# Patient Record
Sex: FEMALE | Race: WHITE | NOT HISPANIC OR LATINO | URBAN - METROPOLITAN AREA
[De-identification: names, ages, dates, MRNs, and addresses within clinical notes are randomized per-mention and may not be internally consistent; named-entity substitution may affect disease eponyms.]

---

## 2023-02-19 PROBLEM — J20.9 ACUTE BRONCHITIS DUE TO INFECTION: Status: ACTIVE | Noted: 2023-02-19

## 2023-02-19 PROBLEM — F17.200 TOBACCO DEPENDENCE: Status: ACTIVE | Noted: 2023-02-19

## 2023-02-19 PROBLEM — F32.A DEPRESSION: Status: ACTIVE | Noted: 2023-02-19

## 2023-02-19 PROBLEM — R05.8 POST-VIRAL COUGH SYNDROME: Status: ACTIVE | Noted: 2023-02-19

## 2023-02-19 PROBLEM — H35.62 RETINAL HEMORRHAGE, LEFT EYE: Status: ACTIVE | Noted: 2023-02-19

## 2023-02-19 PROBLEM — H25.13 CATARACT, NUCLEAR SCLEROTIC, BOTH EYES: Status: ACTIVE | Noted: 2023-02-19

## 2023-02-19 PROBLEM — H52.4 BILATERAL PRESBYOPIA: Status: ACTIVE | Noted: 2023-02-19

## 2023-02-19 PROBLEM — G40.109 TEMPORAL LOBE EPILEPSY (MULTI): Status: ACTIVE | Noted: 2023-02-19

## 2023-02-19 PROBLEM — E55.9 VITAMIN D DEFICIENCY: Status: ACTIVE | Noted: 2023-02-19

## 2023-02-19 PROBLEM — H52.03 HYPERMETROPIA OF BOTH EYES: Status: ACTIVE | Noted: 2023-02-19

## 2023-02-19 RX ORDER — CHOLECALCIFEROL (VITAMIN D3) 25 MCG
TABLET ORAL
COMMUNITY

## 2023-02-19 RX ORDER — DIVALPROEX SODIUM 250 MG/1
TABLET, FILM COATED, EXTENDED RELEASE ORAL
COMMUNITY
Start: 2022-06-21 | End: 2024-05-24 | Stop reason: SDUPTHER

## 2023-02-19 RX ORDER — MECLIZINE HYDROCHLORIDE 25 MG/1
25 TABLET ORAL 2 TIMES DAILY
COMMUNITY
End: 2023-04-25 | Stop reason: ALTCHOICE

## 2023-02-19 RX ORDER — ESCITALOPRAM OXALATE 10 MG/1
10 TABLET ORAL DAILY
COMMUNITY
End: 2024-05-24

## 2023-03-10 ENCOUNTER — APPOINTMENT (OUTPATIENT)
Dept: PRIMARY CARE | Facility: CLINIC | Age: 58
End: 2023-03-10
Payer: COMMERCIAL

## 2023-03-22 LAB
ALANINE AMINOTRANSFERASE (SGPT) (U/L) IN SER/PLAS: 18 U/L (ref 7–45)
ALBUMIN (G/DL) IN SER/PLAS: 4.4 G/DL (ref 3.4–5)
ALKALINE PHOSPHATASE (U/L) IN SER/PLAS: 67 U/L (ref 33–110)
ANION GAP IN SER/PLAS: 19 MMOL/L (ref 10–20)
APPEARANCE, URINE: ABNORMAL
ASPARTATE AMINOTRANSFERASE (SGOT) (U/L) IN SER/PLAS: 18 U/L (ref 9–39)
BACTERIA, URINE: ABNORMAL /HPF
BASOPHILS (10*3/UL) IN BLOOD BY AUTOMATED COUNT: 0.05 X10E9/L (ref 0–0.1)
BASOPHILS/100 LEUKOCYTES IN BLOOD BY AUTOMATED COUNT: 0.6 % (ref 0–2)
BILIRUBIN TOTAL (MG/DL) IN SER/PLAS: 0.3 MG/DL (ref 0–1.2)
BILIRUBIN, URINE: NEGATIVE
BLOOD, URINE: NEGATIVE
C REACTIVE PROTEIN (MG/L) IN SER/PLAS BY HIGH SENSIT: 1.9 MG/L
CALCIDIOL (25 OH VITAMIN D3) (NG/ML) IN SER/PLAS: 29 NG/ML
CALCIUM (MG/DL) IN SER/PLAS: 9.3 MG/DL (ref 8.6–10.6)
CARBON DIOXIDE, TOTAL (MMOL/L) IN SER/PLAS: 23 MMOL/L (ref 21–32)
CHLORIDE (MMOL/L) IN SER/PLAS: 104 MMOL/L (ref 98–107)
CHOLESTEROL (MG/DL) IN SER/PLAS: 215 MG/DL (ref 0–199)
CHOLESTEROL IN HDL (MG/DL) IN SER/PLAS: 63.5 MG/DL
CHOLESTEROL/HDL RATIO: 3.4
COLOR, URINE: YELLOW
CREATININE (MG/DL) IN SER/PLAS: 0.75 MG/DL (ref 0.5–1.05)
EOSINOPHILS (10*3/UL) IN BLOOD BY AUTOMATED COUNT: 0.12 X10E9/L (ref 0–0.7)
EOSINOPHILS/100 LEUKOCYTES IN BLOOD BY AUTOMATED COUNT: 1.5 % (ref 0–6)
ERYTHROCYTE DISTRIBUTION WIDTH (RATIO) BY AUTOMATED COUNT: 13 % (ref 11.5–14.5)
ERYTHROCYTE MEAN CORPUSCULAR HEMOGLOBIN CONCENTRATION (G/DL) BY AUTOMATED: 33.5 G/DL (ref 32–36)
ERYTHROCYTE MEAN CORPUSCULAR VOLUME (FL) BY AUTOMATED COUNT: 101 FL (ref 80–100)
ERYTHROCYTES (10*6/UL) IN BLOOD BY AUTOMATED COUNT: 4.1 X10E12/L (ref 4–5.2)
GFR FEMALE: >90 ML/MIN/1.73M2
GLUCOSE (MG/DL) IN SER/PLAS: 87 MG/DL (ref 74–99)
GLUCOSE, URINE: NEGATIVE MG/DL
HEMATOCRIT (%) IN BLOOD BY AUTOMATED COUNT: 41.5 % (ref 36–46)
HEMOGLOBIN (G/DL) IN BLOOD: 13.9 G/DL (ref 12–16)
IMMATURE GRANULOCYTES/100 LEUKOCYTES IN BLOOD BY AUTOMATED COUNT: 0.5 % (ref 0–0.9)
KETONES, URINE: ABNORMAL MG/DL
LDL: 122 MG/DL (ref 0–99)
LEUKOCYTE ESTERASE, URINE: NEGATIVE
LEUKOCYTES (10*3/UL) IN BLOOD BY AUTOMATED COUNT: 7.9 X10E9/L (ref 4.4–11.3)
LYMPHOCYTES (10*3/UL) IN BLOOD BY AUTOMATED COUNT: 2.79 X10E9/L (ref 1.2–4.8)
LYMPHOCYTES/100 LEUKOCYTES IN BLOOD BY AUTOMATED COUNT: 35.5 % (ref 13–44)
MONOCYTES (10*3/UL) IN BLOOD BY AUTOMATED COUNT: 0.6 X10E9/L (ref 0.1–1)
MONOCYTES/100 LEUKOCYTES IN BLOOD BY AUTOMATED COUNT: 7.6 % (ref 2–10)
MUCUS, URINE: ABNORMAL /LPF
NEUTROPHILS (10*3/UL) IN BLOOD BY AUTOMATED COUNT: 4.27 X10E9/L (ref 1.2–7.7)
NEUTROPHILS/100 LEUKOCYTES IN BLOOD BY AUTOMATED COUNT: 54.3 % (ref 40–80)
NITRITE, URINE: NEGATIVE
NRBC (PER 100 WBCS) BY AUTOMATED COUNT: 0 /100 WBC (ref 0–0)
PH, URINE: 5 (ref 5–8)
PLATELETS (10*3/UL) IN BLOOD AUTOMATED COUNT: 237 X10E9/L (ref 150–450)
POTASSIUM (MMOL/L) IN SER/PLAS: 4.5 MMOL/L (ref 3.5–5.3)
PROTEIN TOTAL: 6.6 G/DL (ref 6.4–8.2)
PROTEIN, URINE: ABNORMAL MG/DL
RBC, URINE: 1 /HPF (ref 0–5)
SODIUM (MMOL/L) IN SER/PLAS: 141 MMOL/L (ref 136–145)
SPECIFIC GRAVITY, URINE: 1.03 (ref 1–1.03)
SQUAMOUS EPITHELIAL CELLS, URINE: 2 /HPF
THYROTROPIN (MIU/L) IN SER/PLAS BY DETECTION LIMIT <= 0.05 MIU/L: 1.61 MIU/L (ref 0.44–3.98)
TRIGLYCERIDE (MG/DL) IN SER/PLAS: 146 MG/DL (ref 0–149)
UREA NITROGEN (MG/DL) IN SER/PLAS: 16 MG/DL (ref 6–23)
UROBILINOGEN, URINE: <2 MG/DL (ref 0–1.9)
VLDL: 29 MG/DL (ref 0–40)
WBC, URINE: 1 /HPF (ref 0–5)

## 2023-03-23 ENCOUNTER — APPOINTMENT (OUTPATIENT)
Dept: PRIMARY CARE | Facility: CLINIC | Age: 58
End: 2023-03-23
Payer: COMMERCIAL

## 2023-04-24 PROBLEM — J20.9 ACUTE BRONCHITIS DUE TO INFECTION: Status: RESOLVED | Noted: 2023-02-19 | Resolved: 2023-04-24

## 2023-04-24 PROBLEM — Z00.00 ANNUAL PHYSICAL EXAM: Status: ACTIVE | Noted: 2023-04-24

## 2023-04-24 PROBLEM — F32.A DEPRESSION: Status: RESOLVED | Noted: 2023-02-19 | Resolved: 2023-04-24

## 2023-04-24 PROBLEM — H52.4 BILATERAL PRESBYOPIA: Status: RESOLVED | Noted: 2023-02-19 | Resolved: 2023-04-24

## 2023-04-24 PROBLEM — H52.03 HYPERMETROPIA OF BOTH EYES: Status: RESOLVED | Noted: 2023-02-19 | Resolved: 2023-04-24

## 2023-04-24 PROBLEM — H35.62 RETINAL HEMORRHAGE, LEFT EYE: Status: RESOLVED | Noted: 2023-02-19 | Resolved: 2023-04-24

## 2023-04-24 PROBLEM — R05.8 POST-VIRAL COUGH SYNDROME: Status: RESOLVED | Noted: 2023-02-19 | Resolved: 2023-04-24

## 2023-04-24 PROBLEM — H25.13 CATARACT, NUCLEAR SCLEROTIC, BOTH EYES: Status: RESOLVED | Noted: 2023-02-19 | Resolved: 2023-04-24

## 2023-04-24 PROBLEM — E78.00 HIGH CHOLESTEROL: Status: ACTIVE | Noted: 2023-04-24

## 2023-04-24 RX ORDER — LAMOTRIGINE 150 MG/1
1 TABLET ORAL 2 TIMES DAILY
COMMUNITY
Start: 2018-04-23 | End: 2024-02-02 | Stop reason: ALTCHOICE

## 2023-04-24 NOTE — ASSESSMENT & PLAN NOTE
Check cardiac calcium score   Discuss starting a statin   Encouraged stopping smoking, start exercise and follow the dash diet

## 2023-04-24 NOTE — ASSESSMENT & PLAN NOTE
Increased risk for breast cancer and pancreatic cancer  Due for repeat mammogram and pap test  - recommend exam twice a year with MRI q 6 months alternating with mammogram.  Last mammogram 10/21/21   - self pay Pancreas MRI normal 3/2/22  Previous Pap test 1/22/2015 .  Pap done today     DEXA Scan ordered     Second shingles vaccine due  New Covid booster due

## 2023-04-24 NOTE — PROGRESS NOTES
"Physical Exam    Name Doretha Rosales    Date of Service :4/25/2023    Doretha Rosales is a 57 y.o. year old female who is being seen for a comprehensive exam  Has a  history of generalized epilepsy since age 16   She underwent genetic testing in May of 2019 for her family history of multiple cancers and was found not to be a carrier of any concerning genes however based on family history, she does have a higher than average chance developing pancreatic cancer and breast cancer. About twice the average person's risk to develop pancreatic cancer (around a 3.2% chance) and her lifetime risk of breast cancer exceeds 20% based on the Tyrer-Cuzick model. She had a self pay MRI of pancreas last year. She has a mammogram scheduled for later today.  She feels like she \"is falling apart\".  Falling more. Was in [place] and tripped and fell down four times.  Also very tired every morning.  It is a struggle to get up. Takes a nap every day.  Always had a poor memory.  Her mouth is watering a ton this year.  Also about 7 weeks ago after biting down on a [employer] bar had significant pain in her right mandible and was unable to open it fully. Went to her dentist.  No xrays.  Medrol dose pack given. It is only about 60 % better.   Has a new neurologist she has an upcoming appointment with.  Dr. Marcano.       ROS:  No foot drop.  No back pain.  poor memory for 20 years.  She is not getting lost.  She is not getting confused.  She did miss the exit on her way over her.  She is distracted and busy.  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  She is working a lot and is very busy.  She does snore.  Still smoking about 2 packs per week .  No chest pain or respiratory complaints.  No postmenopausal bleeding, no urinary symptoms .      Patient Active Problem List   Diagnosis    Temporal lobe epilepsy (CMS/HCC)    Tobacco dependence    Vitamin D deficiency    Annual physical exam    High cholesterol    Encounter for Papanicolaou " smear for cervical cancer screening    Excessive daytime sleepiness    Asymptomatic menopausal state        Past Medical History:   Diagnosis Date    Bilateral presbyopia 2023    Cataract, nuclear sclerotic, both eyes 2023    Colitis presumed to be due to infection 2020    Depression 2023    Lateral epicondylitis     Retinal hemorrhage, left eye 2023    Vertigo         Past Surgical History:   Procedure Laterality Date     SECTION, CLASSIC      CHOLECYSTECTOMY      ENDOMETRIAL BIOPSY  10/19/2016    MOUTH SURGERY  2015    Oral Surgery Tooth Extraction    TUBAL LIGATION          Social History     Tobacco Use    Smoking status: Every Day     Years: 20.00     Types: Cigarettes    Smokeless tobacco: Never      Social History     Social History Narrative    . to Jonathan. Two children. Jonathan is head of [employer]. She is a consultant for non profits. Has two children. Her Son is a student at [employer]. Daughter graduated from [employer]. 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 Doretha is a current smoker.  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 now independent consulting. walking daily. lifting weights          Family History   Problem Relation Name Age of Onset    Pancreatic cancer Mother          d. age 49    Cerebral aneurysm Father      Stroke Father          d. age 84    Other (calcium oxalate renal stones) Sister      Hemochromatosis Sister      Other (calcium oxalate renal stones [Other]) Brother      Breast cancer Mother's Sister  64        d. age 65    Prostate cancer Mother's Brother      Prostate cancer Father's Brother  75        d.age 89    Breast cancer Maternal Grandmother          d. age 80    Cancer Paternal Grandfather  60        d. 65    Hemochromatosis Sibling      Breast cancer Other grandmother         Current Outpatient Medications:     cholecalciferol (Vitamin D-3)  "25 MCG (1000 UT) tablet, Take by mouth., Disp: , Rfl:     divalproex (Depakote ER) 250 mg 24 hr tablet, Take by mouth. 1 pill in the morning and 1 pill at night, Disp: , Rfl:     escitalopram (Lexapro) 10 mg tablet, Take 1 tablet (10 mg) by mouth once daily., Disp: , Rfl:     lamoTRIgine (LaMICtal) 150 mg tablet, Take 1 tablet (150 mg) by mouth in the morning and 1 tablet (150 mg) before bedtime., Disp: , Rfl:     Allergies   Allergen Reactions    Meperidine Other and Unknown     Vomiting     /88 (BP Location: Left arm, Patient Position: Sitting)   Pulse 75   Temp 36.9 °C (98.5 °F)   Ht 1.549 m (5' 1\")   Wt 74.4 kg (164 lb)   SpO2 98%   BMI 30.99 kg/m²  Body mass index is 30.99 kg/m².    Physical Exam  Constitutional:       General: She is not in acute distress.     Appearance: Normal appearance. She is not ill-appearing.   HENT:      Right Ear: Tympanic membrane and ear canal normal.      Left Ear: Tympanic membrane and ear canal normal.      Mouth/Throat:      Mouth: Mucous membranes are moist.      Pharynx: No oropharyngeal exudate or posterior oropharyngeal erythema.   Eyes:      Extraocular Movements: Extraocular movements intact.      Conjunctiva/sclera: Conjunctivae normal.      Pupils: Pupils are equal, round, and reactive to light.   Cardiovascular:      Rate and Rhythm: Normal rate.      Pulses: Normal pulses.      Heart sounds: Normal heart sounds. No murmur heard.  Pulmonary:      Effort: Pulmonary effort is normal. No respiratory distress.      Breath sounds: Normal breath sounds. No wheezing, rhonchi or rales.   Chest:   Breasts:     Right: Normal. No mass, nipple discharge or skin change.      Left: No mass, nipple discharge or skin change.   Abdominal:      General: Bowel sounds are normal. There is no distension.      Palpations: There is no mass.      Tenderness: There is no abdominal tenderness. There is no guarding.   Genitourinary:     Vagina: Normal.      Cervix: Normal.      " Uterus: Normal.       Adnexa: Right adnexa normal and left adnexa normal.        Right: No mass.          Left: No mass.     Musculoskeletal:         General: Normal range of motion.   Lymphadenopathy:      Cervical: No cervical adenopathy.      Upper Body:      Right upper body: No supraclavicular or axillary adenopathy.      Left upper body: No supraclavicular or axillary adenopathy.      Lower Body: No right inguinal adenopathy. No left inguinal adenopathy.   Skin:     General: Skin is warm and dry.      Findings: No rash.      Comments: No suspicious rashes or lesions   Neurological:      General: No focal deficit present.      Mental Status: She is alert.   Psychiatric:         Mood and Affect: Mood normal.         RESULTS/DATA:  Reviewed Standard Labs for this physical with patient ( any significant issues addressed in A/P )     ECG:  Sinus rhythm. Horizontal axis. Normal variant of EKG  .       Lifetime ASCVD Risk: 39% Current 10-Year ASCVD Risk: 5.88% - Borderline Risk Composite Updated 10-Year ASCVD Risk: N/A Current 10-Year ASCVD Risk Had No Treatment Been Initiated: N/A Previous 10-Year ASCVD Risk: N/A Optimal 10-Year ASCVD Risk: 1.51%   Statin + Aspirin N/A BP drug(s) + Aspirin N/A Statin + BP drug(s) 3.23% Statin + Stop smoking 3.22% Stop smoking + Aspirin N/A BP drug(s) + Stop smoking 3.14% Statin + BP drug(s) + Aspirin N/A BP drug(s) + Stop smoking + Aspirin N/A Statin + BP drug(s) + Stop smoking 2.36% Statin + Stop smoking + Aspirin N/A Statin + BP drug(s) + Stop smoking + Aspirin N/A     Assessment/Plan   See patient information/visit summary   Follow up in 3 months    Problem List Items Addressed This Visit       Temporal lobe epilepsy (CMS/HCC)    Tobacco dependence     CT Low dose lung cancer screening          Relevant Orders    CT lung screening low dose    CT cardiac scoring wo IV contrast    Vitamin D deficiency     Start vitamin D 2000 units a day          Annual physical exam - Primary      Increased risk for breast cancer and pancreatic cancer  Due for repeat mammogram and pap test  - recommend exam twice a year with MRI q 6 months alternating with mammogram.  Last mammogram 10/21/21   - self pay Pancreas MRI normal 3/2/22  Previous Pap test 1/22/2015 .  Pap done today     DEXA Scan ordered     Second shingles vaccine due  New Covid booster due          High cholesterol     Check cardiac calcium score   Discuss starting a statin   Encouraged stopping smoking, start exercise and follow the dash diet         Relevant Orders    CT cardiac scoring wo IV contrast    Encounter for Papanicolaou smear for cervical cancer screening    Relevant Orders    THINPREP PAP TEST    Excessive daytime sleepiness    Relevant Orders    Home sleep apnea test (HSAT)    Asymptomatic menopausal state    Relevant Orders    XR DEXA bone density     Other Visit Diagnoses       Mandibular pain        Relevant Orders    XR temporomandibular joint right open and closed            Bryanna uDrham MD

## 2023-04-25 ENCOUNTER — OFFICE VISIT (OUTPATIENT)
Dept: PRIMARY CARE | Facility: CLINIC | Age: 58
End: 2023-04-25
Payer: COMMERCIAL

## 2023-04-25 VITALS
OXYGEN SATURATION: 98 % | DIASTOLIC BLOOD PRESSURE: 88 MMHG | SYSTOLIC BLOOD PRESSURE: 148 MMHG | HEIGHT: 61 IN | BODY MASS INDEX: 30.96 KG/M2 | TEMPERATURE: 98.5 F | WEIGHT: 164 LBS | HEART RATE: 75 BPM

## 2023-04-25 DIAGNOSIS — Z00.00 ANNUAL PHYSICAL EXAM: Primary | ICD-10-CM

## 2023-04-25 DIAGNOSIS — E78.00 HIGH CHOLESTEROL: ICD-10-CM

## 2023-04-25 DIAGNOSIS — F17.200 TOBACCO DEPENDENCE: ICD-10-CM

## 2023-04-25 DIAGNOSIS — G40.109 TEMPORAL LOBE EPILEPSY (MULTI): ICD-10-CM

## 2023-04-25 DIAGNOSIS — Z78.0 ASYMPTOMATIC MENOPAUSAL STATE: ICD-10-CM

## 2023-04-25 DIAGNOSIS — E55.9 VITAMIN D DEFICIENCY: ICD-10-CM

## 2023-04-25 DIAGNOSIS — Z12.4 ENCOUNTER FOR PAPANICOLAOU SMEAR FOR CERVICAL CANCER SCREENING: ICD-10-CM

## 2023-04-25 DIAGNOSIS — G47.19 EXCESSIVE DAYTIME SLEEPINESS: ICD-10-CM

## 2023-04-25 DIAGNOSIS — R68.84 MANDIBULAR PAIN: ICD-10-CM

## 2023-04-25 PROCEDURE — UHSPHYS PR UH SELECT PHYSICAL: Performed by: INTERNAL MEDICINE

## 2023-04-25 PROCEDURE — 88175 CYTOPATH C/V AUTO FLUID REDO: CPT

## 2023-04-25 PROCEDURE — 87624 HPV HI-RISK TYP POOLED RSLT: CPT

## 2023-04-25 NOTE — PATIENT INSTRUCTIONS
Your gyn exam and  pap test was done today  mammogram will be done today   Bone density ordered   Start vitamin D 2000 units    Would recommend you get CT lung cancer screening  Please consider stopping smoking    Would recommend you get CT cardiac score to evaluate for early coronary artery disease       Your cholesterol is mildly elevated as is your blood pressure.  We may consider starting you on a cholesterol lowering medication in the future.  Stopping smoking is the best thing you can due to lower your cardiovascular risk.  Please also check your blood pressure outside of the office at rest and let me know what your bp is.  Your goal is less than 130/80    Lifetime ASCVD Risk: 39% Current 10-Year ASCVD Risk: 5.88% - Borderline Risk Composite Updated 10-Year ASCVD Risk: N/A Optimal 10-Year ASCVD Risk: 1.51%     And you should aim to follow the DASH diet. The DASH diet is  a combination diet rich in fruits, vegetables, legumes, and low-fat dairy products and low in snacks, sweets, meats, and saturated and total fat. The DASH diet is comprised of four to five servings of fruit, four to five servings of vegetables, two to three servings of low-fat dairy per day, and <25 percent fat.    Will obtain a sleep study for your excessive fatigue and poor memory   Will get an xray of your mandible as well   Obtain the new COVID bivalent booster  Obtain your second shingles vaccine    Follow up in 3 months for a recheck of your health issues

## 2023-04-28 ENCOUNTER — DOCUMENTATION (OUTPATIENT)
Dept: PRIMARY CARE | Facility: CLINIC | Age: 58
End: 2023-04-28
Payer: COMMERCIAL

## 2023-04-28 DIAGNOSIS — R42 VERTIGO: Primary | ICD-10-CM

## 2023-04-28 RX ORDER — ONDANSETRON 4 MG/1
4 TABLET, ORALLY DISINTEGRATING ORAL EVERY 8 HOURS PRN
Qty: 20 TABLET | Refills: 0 | Status: SHIPPED | OUTPATIENT
Start: 2023-04-28 | End: 2023-05-05

## 2023-04-28 RX ORDER — MECLIZINE HYDROCHLORIDE 25 MG/1
25 TABLET ORAL 3 TIMES DAILY PRN
Qty: 30 TABLET | Refills: 11 | Status: SHIPPED | OUTPATIENT
Start: 2023-04-28 | End: 2023-08-30 | Stop reason: SDUPTHER

## 2023-04-28 NOTE — PROGRESS NOTES
4 or 5 yr ago I got vertigo bad as had to go to hospital.  It came quxk as it did. Just now. I need whatever medicine I need. Cleaning.lady can pick it up. Your oohine number is down stairs and I can't get there. Can u call me?    Sent via the "[employer] S21 5G, an [employer] 5G smartphone  Get [place] for Android      Spoke with Doretha, she had acute onset of vertigo. Speaking clearly.  Was in bed.    No focal neurologic concerns. No headache.  Will call in zofran and meclazine.    Advised ER for any dehydration, not able to keep fluids down or if she needs relief from her symptoms

## 2023-05-03 DIAGNOSIS — R42 VERTIGO: Primary | ICD-10-CM

## 2023-05-03 LAB
COMPLETE PATHOLOGY REPORT: NORMAL
CONVERTED CLINICAL DIAGNOSIS-HISTORY: NORMAL
CONVERTED DIAGNOSIS COMMENT: NORMAL
CONVERTED FINAL DIAGNOSIS: NORMAL
CONVERTED FINAL REPORT PDF LINK TO COPY AND PASTE: NORMAL

## 2023-05-07 ENCOUNTER — TELEPHONE (OUTPATIENT)
Dept: PRIMARY CARE | Facility: CLINIC | Age: 58
End: 2023-05-07
Payer: COMMERCIAL

## 2023-05-07 DIAGNOSIS — J01.90 ACUTE NON-RECURRENT SINUSITIS, UNSPECIFIED LOCATION: Primary | ICD-10-CM

## 2023-05-07 PROBLEM — R42 VERTIGO: Status: ACTIVE | Noted: 2023-05-07

## 2023-05-07 PROBLEM — G40.909 EPILEPSY (MULTI): Status: ACTIVE | Noted: 2023-05-07

## 2023-05-07 RX ORDER — AMOXICILLIN AND CLAVULANATE POTASSIUM 875; 125 MG/1; MG/1
875 TABLET, FILM COATED ORAL 2 TIMES DAILY
Qty: 20 TABLET | Refills: 0 | Status: SHIPPED | OUTPATIENT
Start: 2023-05-07 | End: 2023-05-17

## 2023-05-07 NOTE — TELEPHONE ENCOUNTER
Me again.    When I went to see you last Monday I had had a cold for about 5 days. Mostly just a bad sore throat. Of course, I tested for covid 19 before coming and it was negative.  Things got pretty interesting subsequent to my visit with the severe vertigo and it seemed to trump the cold. But, the cold remained and I was dinking tea with honey like it was going out of style. Friday, it seemed to move to my head, though my sore throat remained. I got a low fever somewhere around Friday. That night I noticed that the mucus was dark green. Yesterday, I remembered that dark green could signal an infection. It was not gone this morning. I have had a couple of sinus infections in my life and I am thinking this could be one of them. What do you think?    My ears hurt a little bit too, don't know if that is from infection or part of this vertigo thing (the intensity of which comes and goes).    I think my question to you is about a sinus infection and what I would do about it.    As always, thanks.      [address]  [place], [place] [address] [phone]  [email or web address]    From: Bryanna Durham <[email or web address]   Sent: Surendra, May 7, 2023 12:17 PM  To: Doretha Rosales <[email or web address]>  Subject: RE: From your problem patient    Cipriano Coyne,  It is definitely a possibility. Are you still having head congestion and green mucous?     Yep, that's why I decided to write this morning. It hasn't really changed for days.      [address]  [place], [place] [address] [phone]  [email or web address]

## 2023-06-06 LAB — VALPROIC ACID (UG/ML) IN SER/PLAS: 41 UG/ML (ref 50–100)

## 2023-06-07 LAB — LAMOTRIGINE LEVEL: 15.6 UG/ML (ref 2.5–15)

## 2023-08-30 DIAGNOSIS — R42 VERTIGO: ICD-10-CM

## 2023-08-30 RX ORDER — MECLIZINE HYDROCHLORIDE 25 MG/1
25 TABLET ORAL 3 TIMES DAILY PRN
Qty: 30 TABLET | Refills: 0 | Status: SHIPPED | OUTPATIENT
Start: 2023-08-30 | End: 2024-08-29

## 2023-08-30 NOTE — PROGRESS NOTES
Thank you!! Any verdict on the prescription request? It did happen slightly again this morning, but I just ate a lot when it did and took only one pill so I am getting there on how to handle it.      [address]  [place], [place] [address]  [phone]  [email or web address]       From: Pili Mcleod <[email or web address]   Sent: Wednesday, August 30, 2023 1:40 PM  To: Doretha Rosales <[email or web address]>; Bryanna Durham <[email or web address]>  Subject: RE: vertigo    Hi Doretha,   I spoke with Dr. Ortiz assistant, she only has vertigo clinic on Tuesday at [facility] first available is October.  Dr. Jimenes has vertigo clinic on Mondays in [place] and Tuesday at [facility] first available is October as well.    Appointment- (This is a 2 part appointment)  Jennifer Ortiz [facility]  [address]  10/3/2023   830am-Audiology #[id number]  9am- Jennifer Ortiz #146    Please confirm    Pili Mcleod MA  Patient   Bryanna Durham MD  [address] [place] [address]  Phone [phone]  Fax [phone]  Email- [email or web address]                From: Doretha Rosales <[email or web address]>   Sent: Tuesday, August 29, 2023 1:41 PM  To: Bryanna Durham <[email or web address]>  Cc: Pili Mcleod <[email or web address]>  Subject: RE: vertigo    I know, it is the most ridiculous, yet totally debilitating thing. I had to cancel a lunch meeting yesterday and I felt socially/professionally RIDICULOUS! The last time Pili reached out to those doctors, it was 4 months before I could get     I know, it is the most ridiculous, yet totally debilitating thing. I had to cancel a lunch meeting yesterday and I felt socially/professionally RIDICULOUS!     The last time Pili reached out to those doctors, it was 4 months before I could get an appointment. I guess that will have to be what it is because it is what it is! There really isn't  anything else I can do except the Meclazine is somewhat effective. It does upset my stomach, but that is the lesser of two evils! If I took two per day, I would have ten days' supply. Of course, I could never need them again or I could need them tomorrow. Just to make me feel better would you write me another prescription?    I have a new Pharmacy because we moved to [place] and are now trendy empty nesters ??. The Pharmacy is in [facility]: [phone].    So, Pili, if you could do your magic, I would be most grateful. I will only be in town Mondays and Fridays for the next 2 months.    Thanks!            [address]  [place], [place] [address]  [phone]  [email or web address]       From: Bryanna Durham <[email or web address]>   Sent: Tuesday, August 29, 2023 12:58 PM  To: Doretha Rosales <[email or web address]>  Cc: Pili Mcleod <[email or web address]>  Subject: RE: vertigo    Hi Doretha,  First of all, bennett I am sorry to hear that your vertigo came back.  I would recommend getting you to another ENT who specializes In Vertigo for a second opinion   The names I usually give out are Dr. Amber Jimenes or JOAQUINA Berry MD [facility]  [address] [place] [address]  Phone [phone]  Fax [phone]      From: Doretha Rosales <[email or web address]>   Sent: Tuesday, August 29, 2023 11:16 AM  To: Bryanna Durham <[email or web address]>  Subject: vertigo    Hi there, I am not sure that you remember that I had a terrible experience with vertigo in April. Pili was unable to find an appointment with your recommended vertigo person at the time and It coincided with a neurologist appointment I already     Hi there,   I am not sure that you remember that I had a terrible experience with vertigo in April. Pili was unable to find an appointment with your recommended vertigo person at the time and It coincided  with a neurologist appointment I already had scheduled for months. This was just a regular appointment. Pili found me a physical therapist who did all of the tricks (which didn't work) and said that I should come back regularly, which I didn't. The problem went away after a few days. The neurologist over-reacted in my view and gave me an MRI to see if I had a stroke. I went to an ENT who is a family friend and had the whole 3 hour test. The 's conclusion was that he couldn't figure out and neurology was probable the way to go.    When it first happened, you gave me meclizine as I was pretty debilitated and couldn't get out of bed without crawling. Yesterday, it came on suddenly again. I ate something, took the meclizine and lay down.  It was fine after a few hours. I have a business meeting in [place], so I even flew last night feeling fine. I got up this morning and came to my temporary work and was just fine. I brought the meclizine. It just came on suddenly at about the same time as yesterday - all of the sudden.  So, I took two pills about a half an hour ago and haven't gotten up so that I don't freak anyone out.    So, what on earth do I do now? It has only been 4 months since it happened, but now it has happened twice in two days. I am going from [place] to [place] to visit my daughter and then back to [place] and won't be home until Thursday night the 7th. But, I am not sure who I would see. I have been down the neurologist, ENT and physical therapy roads. Help!  Doretha            [address]  [place], [place] [address]  [phone]  [email or web address]         Visit us at [email or web address].    The enclosed information is STRICTLY CONFIDENTIAL and is intended for the  use of the addressee only. [facility] and its affiliates disclaim  any responsibility for unauthorized disclosure of this information to anyone  other than the addressee.    Formerly [facility] and [place] law protect patient medical information,  including  psychiatric_disorders, (H.I.V) test results, A.I.Ds-related conditions,  alcohol, and/or drug_dependence or abuse disclosed in this email. Federal  regulation (42 CFR Part 2) and [place] Revised Code section 5122.31 and  3701.243 prohibit disclosure of this information without the specific  written consent of the person to whom it pertains, or as otherwise permitted  by law.    Visit us at [email or web address].

## 2023-09-01 PROBLEM — Z13.71 BRCA NEGATIVE: Status: ACTIVE | Noted: 2023-09-01

## 2023-09-01 PROBLEM — R42 DIZZINESS AND GIDDINESS: Status: ACTIVE | Noted: 2023-09-01

## 2023-09-01 PROBLEM — H90.3 ASYMMETRIC SNHL (SENSORINEURAL HEARING LOSS): Status: ACTIVE | Noted: 2023-09-01

## 2023-09-01 PROBLEM — H93.13 TINNITUS OF BOTH EARS: Status: ACTIVE | Noted: 2023-09-01

## 2023-09-01 RX ORDER — LAMOTRIGINE 25 MG/1
25 TABLET ORAL 2 TIMES DAILY
COMMUNITY
Start: 2018-04-23 | End: 2024-02-02 | Stop reason: SDUPTHER

## 2023-09-01 RX ORDER — LAMOTRIGINE 100 MG/1
100 TABLET ORAL 2 TIMES DAILY
COMMUNITY
Start: 2023-08-08 | End: 2024-02-02 | Stop reason: SDUPTHER

## 2023-09-14 DIAGNOSIS — G40.109 TEMPORAL LOBE EPILEPSY (MULTI): Primary | ICD-10-CM

## 2023-09-14 RX ORDER — LAMOTRIGINE 150 MG/1
150 TABLET ORAL 2 TIMES DAILY
Qty: 20 TABLET | Refills: 0 | Status: SHIPPED | OUTPATIENT
Start: 2023-09-14 | End: 2024-02-02 | Stop reason: ALTCHOICE

## 2023-10-03 ENCOUNTER — CLINICAL SUPPORT (OUTPATIENT)
Dept: AUDIOLOGY | Facility: CLINIC | Age: 58
End: 2023-10-03
Payer: COMMERCIAL

## 2023-10-03 ENCOUNTER — OFFICE VISIT (OUTPATIENT)
Dept: OTOLARYNGOLOGY | Facility: CLINIC | Age: 58
End: 2023-10-03
Payer: COMMERCIAL

## 2023-10-03 VITALS — BODY MASS INDEX: 30.18 KG/M2 | TEMPERATURE: 97 F | WEIGHT: 165 LBS

## 2023-10-03 DIAGNOSIS — H90.3 SENSORINEURAL HEARING LOSS (SNHL) OF BOTH EARS: ICD-10-CM

## 2023-10-03 DIAGNOSIS — R29.6 FALLS FREQUENTLY: ICD-10-CM

## 2023-10-03 DIAGNOSIS — R42 DIZZINESS: Primary | ICD-10-CM

## 2023-10-03 DIAGNOSIS — R42 VERTIGO: Primary | ICD-10-CM

## 2023-10-03 DIAGNOSIS — H90.3 ASYMMETRIC SNHL (SENSORINEURAL HEARING LOSS): ICD-10-CM

## 2023-10-03 PROCEDURE — 99204 OFFICE O/P NEW MOD 45 MIN: CPT | Performed by: NURSE PRACTITIONER

## 2023-10-03 PROCEDURE — 92557 COMPREHENSIVE HEARING TEST: CPT | Performed by: AUDIOLOGIST

## 2023-10-03 PROCEDURE — 92550 TYMPANOMETRY & REFLEX THRESH: CPT | Performed by: AUDIOLOGIST

## 2023-10-03 ASSESSMENT — PAIN SCALES - GENERAL: PAINLEVEL_OUTOF10: 0 - NO PAIN

## 2023-10-03 ASSESSMENT — PAIN - FUNCTIONAL ASSESSMENT: PAIN_FUNCTIONAL_ASSESSMENT: 0-10

## 2023-10-03 NOTE — PROGRESS NOTES
Chief Complaint   Patient presents with    Dizziness    Hearing Loss        Doretha Rosales, age 58 years, was seen for audiogram in conjunction with otolaryngology appointment on 10/3/2023.  Ms. Rosales reports vertigo/dizziness beginning two years ago.  She has experienced two major episodes with the most recent being April/May 2023.  She has been evaluated by her primary care physician and neurology.  She has completed physical therapy for the treatment of the dizziness.  She does take medications for seizures.  She completed hearing evaluation May 2023 with hearing loss noted both ears with the left ear worse than the right ear.  She denies tinnitus.   Please see medical records for complete history.    TEST RESULTS:  Immittance testing yielded TYPE A tympanograms indicating normal middle ear function right ear  Immittance testing yielded TYPE A tympanograms indicating normal middle ear function left ear    Acoustic reflexes were present 500 - 4000 Hz right ear  Acoustic reflexes were present 500 - 4000 Hz left ear    Distortion product otoacoustic emissions were present 2000 Hz right ear  Distortion product otoacoustic emissions were present 2000 -3000 Hz right ear      Normal hearing levels were obtained 250 - 4000 Hz with a mild sensorineural hearing loss at 8000 Hz right ear  Normal hearing levels were obtained 250 -1000 HZ with a mild sensorineural hearing loss 2000 -8000 Hz left ear    Speech reception thresholds were in agreement with pure tone averages at 10 dBHL right ear  Speech reception thresholds were in agreement with pure tone averages at 15 dBHL left ear  Speech discrimination scores were obtained at 100 % at  dBHL 50 right ear  Speech discrimination scores were obtained at 100 % at  dBHL 50 left ear    IMPRESSIONS:  Normal middle ear function both ears  Normal acoustic reflexes both ears  Abnormal otoacoustic emissions both ears  Normal hearing levels 250 -4000 Hz right ear with mild sensorineural  hearing loss at 8000 Hz  Normal hearing levels 250 - 1000 Hz left ear with mild sensorineural hearing loss 2000 -8000 Hz    Recommendations:  Follow up with otolaryngology as scheduled  Retest hearing levels annually or in conjunction with otological management      time: [phone] - [phone]

## 2023-10-03 NOTE — PROGRESS NOTES
And is a 58-year-old female referred by her PCP.  This patient is referred for evaluation of  episodic vertiginous  dizziness. The patient is not accompanied by anyone. The approximate duration of her complaints is years.    The patient describes her dizziness as episodes of spinning lasting about 24 hours.  During that time, she cannot rollover or walk.  She also reports falling about once a month.  She feels that these are due to inability to correct herself when tripping or stumbling.  When asked about ear pain, headache, phono-photophobia, visual or motion intolerance, sound or pressure induced symptoms, hearing loss, discharge from ear, tinnitus, aural fullness or autophony, the patient admits to history of migraines but no recent significant headache.  When asked about a significant past otological history including history of prior ear surgery, noise exposure, exposure to ototoxic drugs or agents, and/or family history of hearing loss, the patient admits to PE tubes as a child.  She reports episodes of double vision daily she believes is due to her antiseizure medication.  Patient also reports that she feels dizzy a few hours after taking her Depakote every day.    A comprehensive or 10 points review of the patient´s constitutional, neurological, HEENT, pulmonary, cardiovascular and genito-urinary systems showed only those mentioned in history of present illness.    Constitutional: no fever, chills, weight loss or weight gain   General appearance: Appears well, well-nourished, well groomed. No acute distress.   Communication: Normal communication   Psychiatric: Oriented to person, place and time. Normal mood and affect.   Neurologic: Cranial nerves II-XII grossly intact and symmetric bilaterally.   Head and Face:   Head: Atraumatic with no masses, lesions or scarring.   Face: Normal symmetry, no paralysis, synkinesis or facial tic. No scars or deformities.   TMJ: Significant trismus and left tenderness  Eyes:  Conjunctiva not edematous or erythematous   Ears: External inspection of ears with no deformity, scars or masses. Bilateral EACs clear and bilateral TMs intact with no signs of effusions     Neck: Normal appearing, symmetric, trachea midline.   Cardiovascular: Examination of peripheral vascular system shows no clubbing or cyanosis.   Respiratory: No respiratory distress increased work of breathing. Inspection of the chest with symmetric chest expansion and normal respiratory effort.   Skin: No rashes in the head or neck    Bedside occulomotor function assessment for ocular pursuits and saccades, spontaneous nystagmus with left gaze evoked nystagmus.  Head thrust negative bilaterally  Romberg very unsteady, patient swaying in all directions but able to compensate  Fukuda normal  Tandem gait unsteady, patient immediately following left  Gabrielle-Hallpike negative bilaterally    My interpretation of the audiogram done today is normal sloping to mild sensorineural hearing loss bilaterally.  Excellent word recognition scores and normal tympanograms bilaterally.  There is left greater than right asymmetry at 2000 Hz which is 15 dB and again at 4000 Hz which is not significant.   My interpretation of the previous audiogram done 5/10/2023 is right-sided with normal hearing through 2000 Hz sloping to mild sensorineural hearing loss with excellent word recognition score normal tympanogram.  Left side with normal hearing through 1000 Hz sloping to moderate sensorineural hearing loss with excellent word recognition score and normal tympanogram.  VNG completed 5/18/2023 reports abnormal saccadic pursuits only.  Otherwise normal.  This is concerning for a central process.    MRI brain with and without contrast completed 5/1/2023 reports no significant findings.    A/P:    This patient presents for initial evaluation of chronic acquired vertigo, frequent falls, and bilateral/asymmetrical sensorineural hearing loss.    Reassurance given  that otologic exam today is normal.  Audiogram was reviewed in detail.  Fortunately, left greater than right asymmetry has improved compared to her previous audiogram.  We discussed possible etiologies of asymmetry including viral inflammation, vascular insufficiency, noise exposure, acoustic neuroma.  MRI brain with and without contrast was adequate to evaluate for an acoustic neuroma.  The physiology of balance control was explained. The likely possible etiologies were reviewed. I believe the patient does not likely have a peripheral vestibular disorder since VNG was only concerning for a central process and she has a history of migraines and seizures.  We discussed referral to falls clinic versus referral to vestibular physical therapy.  Since I participate in falls clinic, I believe that she would most likely be referred to vestibular therapy.  Therefore, I recommended starting with that.  She was given contact information for 2 practices closer to her home.  If symptoms do not improve with therapy, I asked that she contact my office.  Patient is in agreement with the plan.  All questions were answered to patient's satisfaction.      45 minutes was spent on this patient´s visit. More than 50% of that time was spent in counseling regarding the possible etiologies, test results, treatment options and coordinating care.    This note was created using speech recognition transcription software. Despite proofreading, several typographical errors might be present that might affect the meaning of the content. Please call with any questions.

## 2023-10-10 ENCOUNTER — APPOINTMENT (OUTPATIENT)
Dept: OPHTHALMOLOGY | Facility: CLINIC | Age: 58
End: 2023-10-10
Payer: COMMERCIAL

## 2023-12-07 ENCOUNTER — OFFICE VISIT (OUTPATIENT)
Dept: OPHTHALMOLOGY | Facility: CLINIC | Age: 58
End: 2023-12-07
Payer: COMMERCIAL

## 2023-12-07 DIAGNOSIS — H52.4 PRESBYOPIA: ICD-10-CM

## 2023-12-07 DIAGNOSIS — H52.03 HYPEROPIA, BILATERAL: Primary | ICD-10-CM

## 2023-12-07 PROCEDURE — 92002 INTRM OPH EXAM NEW PATIENT: CPT | Performed by: OPHTHALMOLOGY

## 2023-12-07 ASSESSMENT — REFRACTION_MANIFEST
OD_ADD: +2.50
OS_CYLINDER: -0.50
OS_AXIS: 075
OS_ADD: +2.50
OD_AXIS: 125
OS_SPHERE: +1.25
OD_SPHERE: +1.50
OD_CYLINDER: -0.50

## 2023-12-07 ASSESSMENT — ENCOUNTER SYMPTOMS
CONSTITUTIONAL NEGATIVE: 0
GASTROINTESTINAL NEGATIVE: 0
CARDIOVASCULAR NEGATIVE: 0
MUSCULOSKELETAL NEGATIVE: 0
RESPIRATORY NEGATIVE: 0
ENDOCRINE NEGATIVE: 0
EYES NEGATIVE: 0
NEUROLOGICAL NEGATIVE: 0
ALLERGIC/IMMUNOLOGIC NEGATIVE: 0
PSYCHIATRIC NEGATIVE: 0
HEMATOLOGIC/LYMPHATIC NEGATIVE: 0

## 2023-12-07 ASSESSMENT — REFRACTION_WEARINGRX
OD_ADD: +2.50
OS_ADD: +2.50
OD_SPHERE: +1.25
OS_AXIS: 075
OD_CYLINDER: -0.50
OS_SPHERE: +1.25
OD_AXIS: 125
OS_CYLINDER: -0.50

## 2023-12-07 ASSESSMENT — SLIT LAMP EXAM - LIDS
COMMENTS: NORMAL
COMMENTS: NORMAL

## 2023-12-07 ASSESSMENT — CONF VISUAL FIELD
OD_SUPERIOR_NASAL_RESTRICTION: 0
OS_INFERIOR_NASAL_RESTRICTION: 0
OS_NORMAL: 1
OD_INFERIOR_NASAL_RESTRICTION: 0
OS_INFERIOR_TEMPORAL_RESTRICTION: 0
OS_SUPERIOR_TEMPORAL_RESTRICTION: 0
OD_SUPERIOR_TEMPORAL_RESTRICTION: 0
OS_SUPERIOR_NASAL_RESTRICTION: 0
OD_INFERIOR_TEMPORAL_RESTRICTION: 0
OD_NORMAL: 1

## 2023-12-07 ASSESSMENT — TONOMETRY
OD_IOP_MMHG: 14
OS_IOP_MMHG: 14
IOP_METHOD: GOLDMANN APPLANATION

## 2023-12-07 ASSESSMENT — VISUAL ACUITY
OS_CC: 20/30
OD_CC: 20/25
METHOD: SNELLEN - LINEAR
OD_CC+: -1
CORRECTION_TYPE: GLASSES

## 2023-12-07 ASSESSMENT — EXTERNAL EXAM - RIGHT EYE: OD_EXAM: NORMAL

## 2023-12-07 ASSESSMENT — EXTERNAL EXAM - LEFT EYE: OS_EXAM: NORMAL

## 2023-12-07 NOTE — PROGRESS NOTES
Assessment/Plan   Diagnoses and all orders for this visit:  Hyperopia, bilateral  PresbyopiaAssessment/Plan   Glasses prescription given to patient today   Rx hasn't changed, but it seems she can't find the reading add easily in the present glasses  No significant cataract, exam normal  -Followup in 2 years or as needed for symptoms (RSVP: redness, sensitivity to light, vision loss, pain)

## 2023-12-24 ENCOUNTER — TELEPHONE (OUTPATIENT)
Dept: PRIMARY CARE | Facility: CLINIC | Age: 58
End: 2023-12-24
Payer: COMMERCIAL

## 2023-12-24 DIAGNOSIS — N39.0 URINARY TRACT INFECTION WITHOUT HEMATURIA, SITE UNSPECIFIED: Primary | ICD-10-CM

## 2023-12-24 RX ORDER — CIPROFLOXACIN 500 MG/1
500 TABLET ORAL 2 TIMES DAILY
Qty: 14 TABLET | Refills: 0 | Status: SHIPPED | OUTPATIENT
Start: 2023-12-24 | End: 2023-12-31

## 2023-12-24 NOTE — TELEPHONE ENCOUNTER
Doretha,  I'll call in an antibiotic for you. Start it asap.     From: Doretha Rosales <[email or web address]>   Sent: Sunday, December 24, 2023 11:58 AM  To: Bryanna Durham <[email or web address]>  Subject: Great timing of a UTI!    Rossy Siegel Shantell!    I think I might have a urinary tract infection. I have to go frequently and it stings..i do have some pain in my lower left side. I have a very slight fever. What do you think? I have had a couple before, but not in many years. I'm thinking I should deal with this today.   Thanks,  Doretha      Sent via the [employer] S21 5G, an [employer] 5G smartphone

## 2024-02-02 DIAGNOSIS — G40.209 PARTIAL SYMPTOMATIC EPILEPSY WITH COMPLEX PARTIAL SEIZURES, NOT INTRACTABLE, WITHOUT STATUS EPILEPTICUS (MULTI): ICD-10-CM

## 2024-02-02 DIAGNOSIS — G40.309 GENERALIZED CONVULSIVE EPILEPSY (MULTI): Primary | ICD-10-CM

## 2024-02-02 RX ORDER — LAMOTRIGINE 25 MG/1
25 TABLET ORAL 2 TIMES DAILY
Qty: 180 TABLET | Refills: 3 | Status: SHIPPED | OUTPATIENT
Start: 2024-02-02 | End: 2024-05-28 | Stop reason: SDUPTHER

## 2024-02-02 RX ORDER — LAMOTRIGINE 100 MG/1
100 TABLET ORAL 2 TIMES DAILY
Qty: 180 TABLET | Refills: 3 | Status: SHIPPED | OUTPATIENT
Start: 2024-02-02 | End: 2024-05-28 | Stop reason: SDUPTHER

## 2024-02-24 ENCOUNTER — CLINICAL SUPPORT (OUTPATIENT)
Dept: SLEEP MEDICINE | Facility: HOSPITAL | Age: 59
End: 2024-02-24
Payer: COMMERCIAL

## 2024-02-24 DIAGNOSIS — G47.19 EXCESSIVE DAYTIME SLEEPINESS: ICD-10-CM

## 2024-02-24 PROCEDURE — 95806 SLEEP STUDY UNATT&RESP EFFT: CPT | Performed by: INTERNAL MEDICINE

## 2024-03-20 ENCOUNTER — PATIENT MESSAGE (OUTPATIENT)
Dept: PRIMARY CARE | Facility: CLINIC | Age: 59
End: 2024-03-20
Payer: COMMERCIAL

## 2024-03-20 DIAGNOSIS — G47.33 OBSTRUCTIVE SLEEP APNEA SYNDROME: Primary | ICD-10-CM

## 2024-05-20 NOTE — PROGRESS NOTES
"       [facility]  [facility]  [address]  [facility] [id number] [phone]     [facility]  New Visit Note      Subjective   Patient ID: Doretha Rosales is a 58 y.o. female with past medical history significant for Excessive Daytime Sleepiness, Vit D deficiency, Epilepsy, and Nicotine dependence.      2024: The patient is here {pxarrival:90266} and was referred by PCP Bryanna Durham MD  for comprehensive sleep medicine evaluation due to suspected sleep apnea and excessive daytime sleepiness / fatigue, and review her sleep study. ESS: QUINN:  , and neck circumference is  inches  today.    HPI  {OSAhx:97749}      SLEEP STUDY HISTORY: (personally reviewed raw data such as interpretation report, data sheet, hypnogram, and titration table if available and applicable)  24: Home Sleep Study: AHI 3%; 53.1%, supine AHI 3%: 113.1/hr, Adrián: 79.1%, <88%: 0.8 minutes    SLEEP-WAKE SCHEDULE  Bedtime: *** on weekdays, *** on weekends  Subjective sleep latency: ***  Problems falling asleep: ***  Number of awakenings: *** times per night spontaneously for unknown reasons  Falls back asleep in ***  Problems staying asleep: ***  Final wake time: *** on weekdays, *** on weekends  Out of bed time: *** on weekdays, *** on weekends  Shift work: ***  Naps: ***  Feels rested after a nap: {Yes/No:81485}  Average sleep duration (excluding naps): ***    SLEEP ENVIRONMENT  Sleep location: ***  Sleep status: {sleep status:09547}  Room is dark:  {Yes/No:31313::\"Yes\"}  Room is quiet: {Yes/No:28580::\"Yes\"}  Room is cool: {Yes/No:32545::\"Yes\"}  Bed comfort: good    SLEEP HABITS:   Activities before bedtime: ***  Activities in bed: ***  Preferred sleep position: {Sleep position:15901}    SLEEP ROS:  Night symptoms: {sleep apnea ROS at night:15002}  Morning symptoms: {sleep apnea ROS in mornin}  Daytime symptoms {sleep apnea " ROS at daytime:20157}  Hypersomnia / narcolepsy symptoms: {narcolepsy ROS:22697}  RLS symptoms: {RLS symptoms:32456}  Movements in sleep: {sleep movements:56152}  Parasomnia symptoms: {parasomnia ROS:03464}    WEIGHT: {weight:70591}    REVIEW OF SYSTEMS: All other systems have been reviewed and are negative.    PERTINENT SOCIAL HISTORY:  Occupation: employment status:62065}  Smoking: {Yes/No:02549}  ETOH: {Yes/No:68529}  Marijuana: {Yes/No:82166}  Caffeine: ***  Sleep aids: {Yes/No:41647}  Claustrophobia: {Yes/No:48110}    PERTINENT PAST SURGICAL HISTORY:  {surgical history pertinent in sleep:83179}    PERTINENT FAMILY HISTORY:  {family history in sleep:25623}    Active Problems, Allergy List, Medication List, and PMH/PSH/FH/Social Hx have been reviewed and reconciled in chart. No significant changes unless documented in the pertinent chart section. Updates made when necessary.       Objective     Physical Exam  Constitutional:alert and oriented to time, place, and person  Sinus: *** tenderness to palpation  Palate: Normal / Narrow / High-arched / *** torus palatini  Mallampati ***, *** Tongue scalloping, *** macroglossia  Lungs: Clear to auscultation bilateral, no rales  Heart: Regular rate and rhythm, no murmurs    Assessment/Plan   Doretha Rosales is a 58 y.o. female who is seen to evaluate for ***possible/severe/ moderate/mild obstructive sleep apnea. The pathophysiology of sleep apnea, diagnostic testing (HST vs PSG), treatment options (PAP, oral appliance, surgery, hypoglossal nerve stimulator called Inspire), and supportive management (weight loss, positional therapy, smoking cessation, avoidance of alcohol and sedatives) were discussed with the patient in detail. Risk factors of sleep apnea as well as cardiometabolic and neurocognitive sequelae associated with untreated sleep apnea were also discussed. Lastly, patient was advised to avoid driving vehicle or operating heavy machinery when sleepy.     Doretha   with the following problems:     # OBSTRUCTIVE SLEEP APNEA: AHI 3%; 53.1%, supine AHI 3%: 113.1/hr,   --Start/ Continue [] cmH20 []PAP through 115 network disks.  -Sleep apnea and PAP therapy education were provided at length in the clinic today. Doretha Rosales verbalized understanding.  -Emphasized diet, exercise, and weight loss in the clinic, as were non-supine sleep, avoiding alcohol in the late evening, and driving or operating heavy machinery when sleepy.  Doretha Rosalesverbalizes understanding of the above instructions and risks.    # CHRONIC SLEEP ONSET/ SLEEP MAINTENANCE INSOMNIA:  -likely due to poor sleep hygiene, irregular sleep schedule, depression, anxiety, untreated sleep apnea, nocturia, RLS, delayed sleep phase syndrome, and chronic pain. [Meds] may be a contributing factor as well.  -QUINN:  -Sleep hygiene discuss in the clinic.    # DEPRESSION and ANXIETY:   -Doretha Gann taking [] and participating in psychotherapy.  -Denies HI/SI     # HYPERTENSION/ ATRIAL FIBRILLATION/ CAD/ CHF:  -Blood pressure was controlled today. To control her blood pressure better, instruct the patient to take anti-hypertension medication at bedtime and a water pill in the waking time.  -Denies headache, palpitation, and syncope in the clinic.  -Last Echo:  -Follows with PCP/ Cardiology     # MORBID OBESITY/OBESITY /OVERWEIGHT:  -with a BMI of []. Doretha Rosales most recent Bicarbonate was   Bicarbonate   Date Value Ref Range Status   03/22/2023 23 21 - 32 mmol/L Final      -Encourage to have regular exercise to manage weight well.  -Refer to a nutritionist for weight control.  -Bariatric surgery candidate.    # NASAL CONGESTION:  -Instruct Doretha Maguire use appropriate Flonase spray to ease congestion.  -Refer to Otolaryngology for underlying investigation.    # XEROSTOMIA:  -Instruct Doretha Maguire purchase the Biotene gel to ease the dry mouth symptom,     # TOBACCO ABUSE:Doretha Rosales is a current [] PPD  smoker for [] years.  -Smoking Cessation given  -Decline Smoking Cessation     # RESTLESS LEG SYNDROME: This occurs frequently.  Transferrin (mg/dL)   Date Value   03/30/2019 267     Ferritin (ug/L)   Date Value   03/30/2019 113     We will check a ferritin level today and start OTC iron replacement to ferritin of >75 as indicated.  Caffeine []. Eliminate  Tobacco []. Smoking cessation counseling provided.  Doretha Monzon I discussed Doretha Rosalescurrent medications that could be exacerbating Doretha Rosales symptoms. Will continue [] for now.  Associated diseases [] and response [].  Pramipexole  Ropinirole  Rotigitine  Gabapentin  Pregabalin  Opioids  Benzos     # CIRCADIAN RHYTHM SLEEP-WAKE DISORDER:  -We will obtain sleep logs for two weeks to be brought to next clinic to discuss. We will consider actigraphy to compare and/or confirm sleep log findings.  Delayed Sleep Phase:   -Set wake time, light therapy in the morning.   -Sleep hygiene measures at set bedtime.  Advanced Sleep Phase:   -Set bedtime and light therapy in the evening.   -Sleep hygiene measures at set bedtime.  Irregular Sleep Phase:  -Set bedtime and wake time.   -Daytime routine including scheduled physical activity, social activity and meal times.  Non-24 Sleep-Wake Rhythm:  -Set bedtime and wake time.   -Daytime routine including scheduled physical activity, social activity and meal times.  Shift Work:  -Maximize sleep time to 7-8 hours.   -Make sure the room is dark, quiet, cool and interruptions are eliminated.   -Avoid light in the mornings, wear dark sunglasses driving home.   -Expose self to bright light or daylight upon waking.   -Avoid caffeine 8 hours prior to sleeping.   Jet Lag:  -Try to change your sleep/wake schedule two to three days prior to travel.   -Expose yourself to bright light when you want to be awake.   -Avoid bright light and electronic light when you are nearing time to sleep.   -You can take melatonin OTC as needed 1  hour prior to bedtime as needed.     # SLEEPWALKING/ SLEEP EATING/ REM BEHAVIOR DISORDER:  -Instruct Doretha Rosales to make sure self and bed partner are safe.  -Instruct Doretha Maguire lock bedroom doors and windows.  -Instruct Doretha Maguire hide his/her car keys.  -Instruct Doretha Maguire pad headboard and move furniture away from the bed.  -Instruct Doretha Rosales to put pillows in between him/her with bed partner if kicking or hitting. Consider sleeping separately.  -Instruct Doretha Rosales to remove clutter and furniture from bedroom floor to avoid injury. Consider moving Doretha Rosales mattress to the floor.  -Instruct Doretha Rosales to advise bed partner to calmly lead you back to bed with a gentle voice. Avoid touching and grabbing.  -Instruct Doretha Rosales to  find evidence of sleep eating, especially if he/she is gaining weight, consider locking Doretha Rosales fridge and pantry at night.  -We will consider a trial of low dose clonazepam 0.5 mg nightly.     #IDIOPATHIC HYPERSOMNIA VS. NARCOLEPSY:  - Will order an overnight sleep study, followed by MSLT the next day  - Perform urine toxicology prior to sleep study.  - Will call patient within 3 weeks after the test. Will discuss follow up plan at that time.  - May consider checking TSH and iron studies to rule out underlying metabolic etiologies.  -Scheduled naps  -Consolidate night time sleep.     *An OARRS report was reviewed and is consistent with prescribed medications.   *Considered the risks of abuse, dependence, addiction, and diversion and [] medication is felt to be clinically appropriate based on documented diagnosis.  *A controlled substance agreement was reviewed and signed today in clinic.   *Pending scanned copy in to the chart per office staff.    RTC      All of patient's questions were answered. She verbalizes understanding and agreement with my assessment and plan.

## 2024-05-24 DIAGNOSIS — G40.109 TEMPORAL LOBE EPILEPSY (MULTI): Primary | ICD-10-CM

## 2024-05-24 RX ORDER — DIVALPROEX SODIUM 250 MG/1
TABLET, FILM COATED, EXTENDED RELEASE ORAL
Qty: 180 TABLET | Refills: 3 | OUTPATIENT
Start: 2024-05-24

## 2024-05-24 RX ORDER — DIVALPROEX SODIUM 250 MG/1
250 TABLET, FILM COATED, EXTENDED RELEASE ORAL 2 TIMES DAILY
Qty: 180 TABLET | Refills: 3 | Status: SHIPPED | OUTPATIENT
Start: 2024-05-24 | End: 2024-05-28 | Stop reason: SDUPTHER

## 2024-05-28 DIAGNOSIS — G40.309 GENERALIZED CONVULSIVE EPILEPSY (MULTI): ICD-10-CM

## 2024-05-28 DIAGNOSIS — F32.A DEPRESSION, UNSPECIFIED DEPRESSION TYPE: ICD-10-CM

## 2024-05-28 DIAGNOSIS — G40.109 TEMPORAL LOBE EPILEPSY (MULTI): ICD-10-CM

## 2024-05-28 RX ORDER — LAMOTRIGINE 100 MG/1
100 TABLET ORAL 2 TIMES DAILY
Qty: 180 TABLET | Refills: 3 | Status: SHIPPED | OUTPATIENT
Start: 2024-05-28 | End: 2025-05-28

## 2024-05-28 RX ORDER — LAMOTRIGINE 25 MG/1
25 TABLET ORAL 2 TIMES DAILY
Qty: 180 TABLET | Refills: 3 | Status: SHIPPED | OUTPATIENT
Start: 2024-05-28 | End: 2025-05-28

## 2024-05-28 RX ORDER — ESCITALOPRAM OXALATE 10 MG/1
10 TABLET ORAL DAILY
Qty: 90 TABLET | Refills: 3 | Status: SHIPPED | OUTPATIENT
Start: 2024-05-28

## 2024-05-28 RX ORDER — DIVALPROEX SODIUM 250 MG/1
250 TABLET, FILM COATED, EXTENDED RELEASE ORAL 2 TIMES DAILY
Qty: 180 TABLET | Refills: 3 | Status: SHIPPED | OUTPATIENT
Start: 2024-05-28 | End: 2025-05-28

## 2024-05-29 ENCOUNTER — APPOINTMENT (OUTPATIENT)
Dept: SLEEP MEDICINE | Facility: HOSPITAL | Age: 59
End: 2024-05-29
Payer: COMMERCIAL

## 2024-07-07 DIAGNOSIS — G40.109 TEMPORAL LOBE EPILEPSY (MULTI): Primary | ICD-10-CM

## 2024-07-07 RX ORDER — LAMOTRIGINE 25 MG/1
25 TABLET ORAL 2 TIMES DAILY
Qty: 6 TABLET | Refills: 0 | Status: SHIPPED | OUTPATIENT
Start: 2024-07-07 | End: 2024-07-10

## 2024-07-07 RX ORDER — LAMOTRIGINE 100 MG/1
100 TABLET ORAL 2 TIMES DAILY
Qty: 6 TABLET | Refills: 0 | Status: SHIPPED | OUTPATIENT
Start: 2024-07-07 | End: 2024-07-10

## 2024-07-07 RX ORDER — DIVALPROEX SODIUM 250 MG/1
250 TABLET, FILM COATED, EXTENDED RELEASE ORAL 2 TIMES DAILY
Qty: 6 TABLET | Refills: 0 | Status: SHIPPED | OUTPATIENT
Start: 2024-07-07 | End: 2024-07-10

## 2024-07-10 ENCOUNTER — APPOINTMENT (OUTPATIENT)
Dept: SLEEP MEDICINE | Facility: CLINIC | Age: 59
End: 2024-07-10
Payer: COMMERCIAL

## 2024-07-17 DIAGNOSIS — F17.200 TOBACCO DEPENDENCE: Primary | ICD-10-CM

## 2024-07-17 PROBLEM — Z13.71 BRCA NEGATIVE: Status: RESOLVED | Noted: 2023-09-01 | Resolved: 2024-07-17

## 2024-07-17 NOTE — PROGRESS NOTES
[facility]  [facility]  [facility]  [address] [place] [address]     [facility]  New Visit Note    Virtual or Telephone Consent    An interactive audio and video telecommunication system which permits real time communications between the patient (at the originating site) and provider (at the distant site) was utilized to provide this telehealth service.   Verbal consent was requested and obtained from Doretha Rosales on this date, 07/26/24 for a telehealth visit.        Subjective   Patient ID: Doretha Rosales is a 58 y.o. female with past medical history significant for Vit D deficiency, Epilepsy, Insomnia, Nicotine dependence, and Sleep disorder breathing.    7/26/2024: The patient had a virtual visit with me  and was referred by PCP Bryanna Durham MD  for comprehensive sleep medicine evaluation due to sleep apnea recently diagnosed. Today, we review her sleep study to treat her sleep apnea. The desensitization strategy, 30-day free mask return policy, and insurance requirements are all discussed with the patient. The patient verbalized understanding it. Her ESS: 13, and her  QUINN: 13 today.    HPI  Patient had been having these symptoms for the past 10 years. Patient had Home Sleep Study  in 2024 which showed HELIO but no CPAP started yet.      SLEEP STUDY HISTORY: (personally reviewed raw data such as interpretation report, data sheet, hypnogram, and titration table if available and applicable)  2/24/24: Home Sleep Study: BMI : 29.29, AHI3%: 53.1/hr, Supine AHI3%: 113.7/hr, AHI 4%: 37.9/hr, Supine AHI 4%: 95.6/hr, Adrián: 79.1%, <88%: 0.8 minutes, consider 6-16 CWP    SLEEP-WAKE SCHEDULE  Bedtime: 11:30 PM on weekdays, same on weekends  Subjective sleep latency: 20-30 minutes  Problems falling asleep: sometimes  Number of awakenings: 2 times per night spontaneously for unknown reasons  Falls back asleep in 30  minutes  Problems staying asleep: Yes  Final wake time: 9 AM on weekdays, 10:30 AM on weekends  Out of bed time: 9:15 AM on weekdays, 10:45 AM on weekends  Shift work: No  Naps: Yes, 90 minutes  Feels rested after a nap: No   Average sleep duration (excluding naps): 8-9 hours    SLEEP ENVIRONMENT  Sleep location: beds  Sleep status: sleeps alone  Room is dark:  Yes  Room is quiet: Yes  Room is cool: Yes  Bed comfort: good    SLEEP HABITS:   Activities in bed: no  Preferred sleep position: right side    SLEEP ROS:  Night symptoms: POSITIVE for snoring and mouth breathing  Morning symptoms: POSITIVE for unrefreshing sleep and morning dry mouth  Daytime symptoms POSITIVE for daytime sleepiness, fatigue, and issues with memory, mood.  Hypersomnia / narcolepsy symptoms: Patient denies symptoms of a hypersomnolence disorder such as sleep paralysis, sleep-related hallucinations, recurrent sleep attacks, automatic behaviors, and cataplexy.   RLS symptoms: Patient denies RLS symptoms.  Movements in sleep: Patient denies problematic movements in sleep such as seizures during sleep, frequent leg kicks / jerks while asleep, and sleep-related bruxism.  Parasomnia symptoms: Patient denies symptoms of parasomnia such as sleepwalking, sleeptalking, sleep-eating, acting out dreams, and nightmares.     WEIGHT: stable    REVIEW OF SYSTEMS: All other systems have been reviewed and are negative.    PERTINENT SOCIAL HISTORY:  Occupation: consultant  Smoking: No   ETOH: Yes   Marijuana: No   Caffeine: Yes  Sleep aids: Yes  and Melatonin  Claustrophobia: No     Active Problems, Allergy List, Medication List, and PMH/PSH/FH/Social Hx have been reviewed and reconciled in chart. No significant changes unless documented in the pertinent chart section. Updates made when necessary.       Objective     Physical Exam  Constitutional:alert and oriented to time, place, and person    Assessment/Plan   Doretha Rosales is a 58 y.o. female who is seen to  evaluate for severe obstructive sleep apnea. The pathophysiology of sleep apnea, diagnostic testing (HST vs PSG), treatment options (PAP, oral appliance, surgery, hypoglossal nerve stimulator called Inspire), and supportive management (weight loss, positional therapy, smoking cessation, avoidance of alcohol and sedatives) were discussed with the patient in detail. Risk factors of sleep apnea as well as cardiometabolic and neurocognitive sequelae associated with untreated sleep apnea were also discussed. Lastly, patient was advised to avoid driving vehicle or operating heavy machinery when sleepy.     Doretha Rosales with the following problems:     # OBSTRUCTIVE SLEEP APNEA :  -Start 5-15 cmH20 auto CPAP through [employer]. (Expedite it)  -Sleep apnea and PAP therapy education were provided at length in the clinic today. Doretha Rosales verbalized understanding.  -Emphasized diet, exercise, and weight loss in the clinic, as were non-supine sleep, avoiding alcohol in the late evening, and driving or operating heavy machinery when sleepy.  Dorethaheather Salvadorsusanaverbalizes understanding of the above instructions and risks.    # CHRONIC SLEEP ONSET/ SLEEP MAINTENANCE INSOMNIA:  -likely due to poor sleep hygiene, and  untreated sleep apnea.  -QUINN: 13  -Sleep hygiene discuss in the clinic.    # DEPRESSION and ANXIETY:   -Doretha Gann taking escitalopram (Lexapro) 10 mg and participating in psychotherapy.  -Denies HI/SI     # XEROSTOMIA:  -Instruct Doretha Rosalesto purchase the Biotene gel to ease the dry mouth symptom,       RTC 1 month after receiving CPAP       All of patient's questions were answered. She verbalizes understanding and agreement with my assessment and plan.

## 2024-07-25 DIAGNOSIS — Z00.00 HEALTHCARE MAINTENANCE: ICD-10-CM

## 2024-07-26 ENCOUNTER — APPOINTMENT (OUTPATIENT)
Dept: SLEEP MEDICINE | Facility: CLINIC | Age: 59
End: 2024-07-26
Payer: COMMERCIAL

## 2024-07-26 DIAGNOSIS — G47.00 INSOMNIA, UNSPECIFIED TYPE: ICD-10-CM

## 2024-07-26 DIAGNOSIS — F32.A ANXIETY AND DEPRESSION: ICD-10-CM

## 2024-07-26 DIAGNOSIS — G47.33 OBSTRUCTIVE SLEEP APNEA SYNDROME: Primary | ICD-10-CM

## 2024-07-26 DIAGNOSIS — F41.9 ANXIETY AND DEPRESSION: ICD-10-CM

## 2024-07-26 PROCEDURE — 99214 OFFICE O/P EST MOD 30 MIN: CPT

## 2024-07-26 PROCEDURE — G2211 COMPLEX E/M VISIT ADD ON: HCPCS

## 2024-07-26 RX ORDER — MELATONIN 1 MG
TABLET,CHEWABLE ORAL
COMMUNITY

## 2024-07-26 ASSESSMENT — SLEEP AND FATIGUE QUESTIONNAIRES
SLEEP_PROBLEM_NOTICEABLE_TO_OTHERS: NOT AT ALL NOTICEABLE
ESS-CHAD TOTAL SCORE: 13
HOW LIKELY ARE YOU TO NOD OFF OR FALL ASLEEP WHILE SITTING QUIETLY AFTER LUNCH WITHOUT ALCOHOL: SLIGHT CHANCE OF DOZING
HOW LIKELY ARE YOU TO NOD OFF OR FALL ASLEEP IN A CAR, WHILE STOPPED FOR A FEW MINUTES IN TRAFFIC: WOULD NEVER DOZE
SATISFACTION_WITH_CURRENT_SLEEP_PATTERN: VERY DISSATISFIED
WORRIED_DISTRESSED_DUE_TO_SLEEP: MUCH
HOW LIKELY ARE YOU TO NOD OFF OR FALL ASLEEP WHILE WATCHING TV: HIGH CHANCE OF DOZING
HOW LIKELY ARE YOU TO NOD OFF OR FALL ASLEEP WHEN YOU ARE A PASSENGER IN A CAR FOR AN HOUR WITHOUT A BREAK: HIGH CHANCE OF DOZING
HOW LIKELY ARE YOU TO NOD OFF OR FALL ASLEEP WHILE LYING DOWN TO REST IN THE AFTERNOON WHEN CIRCUMSTANCES PERMIT: HIGH CHANCE OF DOZING
SLEEP_PROBLEM_INTERFERES_DAILY_ACTIVITIES: VERY MUCH NOTICEABLE
HOW LIKELY ARE YOU TO NOD OFF OR FALL ASLEEP WHILE SITTING AND TALKING TO SOMEONE: WOULD NEVER DOZE
DIFFICULTY_FALLING_ASLEEP: MILD
DIFFICULTY_STAYING_ASLEEP: MILD
SITING INACTIVE IN A PUBLIC PLACE LIKE A CLASS ROOM OR A MOVIE THEATER: WOULD NEVER DOZE
HOW LIKELY ARE YOU TO NOD OFF OR FALL ASLEEP WHILE SITTING AND READING: HIGH CHANCE OF DOZING

## 2024-07-26 ASSESSMENT — PAIN SCALES - GENERAL: PAINLEVEL: 0-NO PAIN

## 2024-07-26 ASSESSMENT — PATIENT HEALTH QUESTIONNAIRE - PHQ9
1. LITTLE INTEREST OR PLEASURE IN DOING THINGS: NOT AT ALL
1. LITTLE INTEREST OR PLEASURE IN DOING THINGS: NOT AT ALL
SUM OF ALL RESPONSES TO PHQ9 QUESTIONS 1 AND 2: 0
SUM OF ALL RESPONSES TO PHQ9 QUESTIONS 1 AND 2: 0
2. FEELING DOWN, DEPRESSED OR HOPELESS: NOT AT ALL
2. FEELING DOWN, DEPRESSED OR HOPELESS: NOT AT ALL

## 2024-07-26 NOTE — PATIENT INSTRUCTIONS
[facility] Sleep Medicine  [facility] ONE  [facility] ONE  [address] [place] [address]       Thank you for coming to the Sleep Medicine Clinic today! Your sleep medicine provider today was: LISANDRO Lafleur Below is a summary of your treatment plan, patient education, other important information, and our contact numbers.    Dear Doretha Rosales,    Thank you for visiting  Sleep Medicine Clinic !     1. According to your symptom and sleep study report. I will order the new PAP device for you to control your sleep apnea, feel free to contact your DME.   If [employer] is your DME, you can reach them at [phone].   2. Please do not drive when you are sleepy and  start practicing the sleep hygiene.     3. Please using Biotene to ease your dry mouth if needed.    4. FOR QUESTIONS AND CONCERNS:   a) : In case of problems with machine or mask interface, please contact your DME company first. DME is the company who provides you the machine and/or PAP supplies / accessories. If [employer] is your DME, you can reach them at [phone].   b): Please call my office with issues or questions: [phone] ([place]); [phone] ([facility]); [phone] ([facility])    If you have a CPAP or BiPAP machine at home, please bring the unit and all accessories including the power cord to your appointments unless I tell you otherwise. Please have knowledge of the DME company you worked with to receive your PAP device. If you have copies of any previous sleep testing completed outside of , please bring with you to clinic as well. This information will make our visits more productive.     If you are new to CPAP or BiPAP, please note the minimum usage insurance requires to continuing coverage for the equipment as noted by your DME company. Please discuss equipment issues (PAP unit, mask fit, humidification, etc.) with your DME company first.       In the  event that you are running more than 10 minutes late to your appointment, I will kindly ask you to reschedule. Thanks.      TREATMENT PLAN     Follow-up Appointment:   Follow-up in 31 days after getting new machine.    PATIENT EDUCATION     OBSTRUCTIVE SLEEP APNEA (HELIO) is a sleep disorder where your upper airway muscles relax during sleep and the airway intermittently and repetitively narrows and collapses leading to partially blocked airway (hypopnea) or completely blocked airway (apnea) which, in turn, can disrupt breathing in sleep, lower oxygen levels while you sleep and cause night time wakings. Because both apnea and hypopnea may cause higher carbon dioxide or low oxygen levels, untreated HELIO can lead to heart arrhythmia, elevation of blood pressure, and make it harder for the body to consolidate memory and facilitate metabolism (leading to higher blood sugars at night). Frequent partial arousals occur during sleep resulting in sleep deprivation and daytime sleepiness. HELIO is associated with an increased risk of cardiovascular disease, stroke, hypertension, and insulin resistance. Moreover, untreated HELIO with excessive daytime sleepiness can increase the risk of motor vehicular accidents.    Below are conservative strategies for HELIO regardless of HELIO severity are:   Positional therapy - Avoid sleeping on your back.   Healthy diet and regular exercise to optimize weight is highly encouraged.   Avoid alcohol late in the evening and sedative-hypnotics as these substances can make sleep apnea worse.   Improve breathing through the nose with intranasal steroid spray, saline rinse, or antihistamines    Safety: Avoid driving vehicle and operating heavy equipment while sleepy. Drowsy driving may lead to life-threatening motor vehicle accidents. A person driving while sleepy is 5 times more likely to have an accident. If you feel sleepy, pull over and take a short power nap (sleep for less than 30 minutes). Otherwise,  ask somebody to drive you.    Treatment options for sleep apnea include weight management, positional therapy, Positive Airway Therapy (PAP) therapy, oral appliance therapy, hypoglossal nerve stimulator (Inspire) and select airway surgeries.    Starting Positive Airway Pressure (PAP): You were ordered a device to wear when you sleep called PAP (Positive Airway Pressure) to treat your sleep apnea. The order will be submitted to a durable medical equipment (DME) company who will arrange setting you up with the device. They will provide all the necessary equipment and discuss use and maintenance of the device with you as well as mask fitting and process of replacing / renewing PAP supplies or accessories. Once you get the machine, please start using it immediately. You may not be successful right away and that is okay. [place] be certain that you keep trying nightly and reach out to DME if you are struggling or having issues with machine usage.     *Please follow-up with me in 1-2 months of starting CPAP to see how well it is working for you and to do some troubleshooting if needed. Also, please bring all PAP equipment with you to follow up appointments unless told otherwise.     Important things to keep in mind as you start PAP:  Insurance will monitor your usage during the first 90 days. You should use your PAP - all night, every night, and including all naps (especially if naps are more than 30 minutes) for your health. The bare minimum is to use your PAP device while sleeping for at least 4 hours per day at least 5-6 days per week.. Otherwise, your PAP device will be reclaimed by your DME company at 90 days.  There are many masks to choose from to wear with your PAP machine. If you are not comfortable with the first mask issued to you, call your DME company and ask for another option to try. You typically have a 30-day mask guarantee from the day you received your machine.   Discuss with your provider if you are  having issues breathing with the machine or if the temperature or humidity feel uncomfortable.  Expect to have an adjustment period when you start your device. It helps to continuing wearing the machine every day for a period of time until you get more used to it. You can practice with wearing the mask alone if you need, then add in the PAP air pressure a few days later.   Reach out for help if you are struggling! The sleep medicine department can be reached at [phone]  We encourage you to download data monitoring apps to your phone. For [employer] 10/11 - Kite linda. For [employer] - DreamMapper. Both apps are available in the [employer] store for free and are a great tool to monitor your progress with your PAP device night to night.    Tips for success with PAP machine usage:  Comfortable and well-fitting mask  Appropriate pressure on the machine  Using humidification  Support from bed partner and clinical team      Maintaining your CPAP/BPAP device:    The humidification chamber (aka water tank or water chamber) needs to be filled with distilled water to prevent buildup of white deposits in the future. If you cannot find distilled water, you can use tap water but expect to have white deposits buildup seen after prolonged use with tap water. If you start seeing white deposits on the water chamber, you can clean it by filling it with equal parts of distilled white vinegar and water. Let the vinegar-water mixture sit for 2 hours, and then rinse it with running tap water. Clean with soap and water then let it dry.     You should try to keep your machine clean in order to work well. Here are some tips to clean PAP supplies / accessories:    Clean the humidification chamber (aka water tank) as well as your mask and tubing at least once a week with soap and water.   Alternatively, you can fill a sink or basin with warm water and add a little mild detergent, like Ivory dish soap. Gently wipe your  supplies with the soapy water to free all the oils and dirt that may have collected. Once that's done, rinse these items with clean water until the soap is gone and let them air dry. You can hang your tubing over the curtain jerome in your bathroom so that it dries.  The mask insert (part of the mask that has contact with your skin) needs to be cleaned with soap and water daily. Another option is to wipe them down with CPAP wipes or baby wipes.    You should replace your mask and tubing frequently in order to prevent bacteria buildup, machine damage, and mask seal issues. The older the mask and hose, the high likelihood that there is bacteria buildup in it especially if they are not cleaned regularly. Dirty filters damage machines because build-up of dust and contaminants can cause machine to over-heat, and in time, damage the motor of machine. Cushions lose their seal over time as most masks are made of plastic and silicone while headgear is made of neoprene. These materials will break down with age and frequent use. Here is the recommended replacement schedule for PAP supplies / accessories:    Twice a month- disposable filters and cushions for nasal mask or nasal pillows.  Once a month- cushion for full face mask  Every 3 months- mask with headgear and PAP tubing (standard or heated hose)  Every 6 months- reusable filter, water chamber, and chin strap     Other useful information:    Some people do not put water in the tank while other people prefers to put water in the tank to prevent mouth dryness. Try to experiment to determine which is more comfortable for you.   In general, new machines have 2 years warranty on parts while health insurance allows you to have a new machine once every 5 years.     Common issues with PAP machine:    Mask gets dislodged when turning to the side: Consider getting a CPAP pillow or switching to a mask with hose on top.     Dry mouth:  Your machine has built-in humidifier that heats up  "the air to prevent dry mouth. It can be adjusted to your comfort. You can try that first and increase setting one level one night at a time to check which setting is comfortable and effective in lessening dry mouth. In some patients with heated hose, adjusting tube temperature to make air warmer can improve dry mouth. If dry mouth persists despite adjusting humidity or tube temperature setting, may apply OTC Biotene gel over the gums at bedtime.  If Biotene gel is not effective, consider trying XEROSTOM gel from [employer].  Also, eliminate or reduce dose of medications that can cause dry mouth if possible. Lastly, may try getting a separate room humidifier machine.    Airleaks: Please call DME as they may need to adjust your mask or refit you with a different kind or different size of mask. In addition, you can ask DME for tips on getting a good mask seal and mask fit.     Difficulty tolerating the mask: Contact your DME to try a different kind of mask and/or call office to get a referral to Sleep Psychologist for CPAP desensitization. CPAP desensitization technique is a set of strategies that helps patient cope with claustrophobia and anxiety related to wearing mask. Alternatively, we can do a daytime mini-sleep study called PAP-nap trial wherein you will try on different kinds of mask and the sleep technician will try different pressure settings on CPAP and BPAP machines to see which specific pressure is tolerable and comfortable for you.     Water droplets or moisture within the hose and/or mask: This is called rain-out and it is caused by condensation of too much heated humidity on the cooler walls of the hose. If you have rain-out, turn down humidity settings or get a heated hose. If you already have a heated hose, turn up the \"tube temperature\" of the heated hose. Alternatively, if you don't want to get a heated hose or warmer air, may wrap the CPAP hose with stockings to keep it somewhat warm. Also, you need " to place the machine on the floor and lower the hose so that water won't travel upward towards your mask.     PAP desensitization techniques: If you have concerns about something being on your face at night, you can start by getting used to it before trying to sleep with it as follows:      Sit in a comfortable chair or bed. Connect the mask and hose to the CPAP/BPAP machine. Hold the mask on your face (without straps on) and turn on the machine. Practice breathing with the mask on while awake sitting and watching television, reading, or performing a sedentary activity during the day for 5-10 minutes and then take it off.  If tolerated, try again and gradually build up to longer periods of time. If not tolerated, try and try again until it is more comfortable as you become more desensitized. If you are able to use it for at least 20-30 minutes, move unto the next step.     Sit in a comfortable chair or bed. Connect the mask and hose to the CPAP/BPAP machine. Strap the mask on your head and turn on the machine. Practice breathing with the mask and headgear on while awake sitting and watching television, reading, or performing a sedentary activity. Start with 5-10 minutes and gradually increasing time until you can wear it comfortably for at least 20-30 minutes, then move to the next step.    Take a shorter daytime nap with machine turned on while you are in a reclined position in bed, sofa, or recliner. Start with 5-10 minute nap and gradually increase up to 30 minutes. It is not important whether you fall asleep or not. The goal is to rest comfortably with PAP machine on.     Reintroduce PAP machine into nighttime sleep. You can begin using it a portion of the night and gradually increase up to entire night.     Proceed from one step to the next only when you are completely comfortable. If you feel any anxiety or discomfort, return to the previous step, then proceed again when comfortable.    Expect to “work” with  your CPAP/BIPAP unit. It is important to try to relax when beginning CPAP/BIPAP therapy. Inhalation and exhalation should occur through the nose only. If you are unable to consistently breathe this way, do not panic or lose hope. There are other types of masks which allow you to breathe through your nose and/or your mouth. Also, in some patients, using intranasal steroid spray (e.g. Flonase or Nasocort or Fluticasone) 1 hour before bedtime and/or before putting on CPAP mask can help tolerate breathing through the mask.    Don't give up after a few attempts--some patients adjust quickly, while some patients need 3-4 weeks (or sometimes even longer) to be accustomed to CPAP therapy.  Contact your sleep medicine specialist if you have a significant change in weight since this may affect your pressure.    You can also go to the following EDUCATION WEBSITES for further information:   American Academy of Sleep Medicine http://sleepeducation.org  National Sleep Foundation: https://sleepfoundation.org  American Sleep Apnea Association: https://www.sleepapnea.org (for patients with sleep apnea)  Narcolepsy Network: https://www.narcolepsynetwork.org (for patients with narcolepsy)  [employer]: https://www.wakeupVia optronicscolepsy.org (for patients with narcolepsy)  Hypersomnia Foundation: https://www.hypersomniafoundation.org (for patients with idiopathic hypersomnia)  RLS foundation: https://www.rls.org (for patients with restless leg syndrome)    IMPORTANT INFORMATION     Call 911 for medical emergencies.  Our offices are generally open from Monday-Friday, 8 am - 5 pm.   There are no supporting services by either the sleep doctors or their staff on weekends and Holidays, or after 5 PM on weekdays.   If you need to get in touch with me, you may either call my office number or you can use Quat-E.  If a referral for a test, for CPAP, or for another specialist was made, and you have not heard about scheduling this within a  week, please call scheduling at [phone] ([phone]).  If you are unable to make your appointment for clinic or an overnight study, kindly call the office or sleep testing center at least 48 hours in advance to cancel and reschedule.  If you are on CPAP, please bring your device's card and/or the device to each clinic appointment.   In case of problems with PAP machine or mask interface, please contact your DME (Durable Medical Equipment) company first. DME is the company who provides you the machine and/or PAP supplies.       PRESCRIPTIONS     We require 7 days advanced notice for prescription refills. If we do not receive the request in this time, we cannot guarantee that your medication will be refilled in time.    IMPORTANT PHONE NUMBERS     Sleep Medicine Clinic Fax: [phone]  Appointments (for Pediatric Sleep Clinic): [phone] - option 1  Appointments (for Adult Sleep Clinic): [phone] - option 2  Appointments (For Sleep Studies): [phone] - option 3  Behavioral Sleep Medicine: [phone]  Sleep Surgery: [phone]  Nutrition Service: [phone]  Weight management clinics with endocrinology: [phone]  Bariatric Services: [phone] (includes weight loss medications and weight loss surgery)  [facility]: [phone] (offers holistic approaches to weight management)  ENT (Otolaryngology): [phone]  Headache Clinic (Neurology): [phone]  Neurology: [phone]  Psychiatry: [phone]  Pulmonary Function Testing (PFT) Center: [phone]  Pulmonary Medicine: [phone]  [employer]): [phone]      OUR SLEEP TESTING LOCATIONS     Our team will contact you to schedule your sleep study, however, you can contact us as follow:  Main Phone Line (scheduling only): [phone] ([phone]), option 3  Adult and Pediatric Locations  [facility] (6 years and older): [facility] - 4th floor ([address] [place] [address]  "[facility]) After hours line: [phone]  [facility] at [facility] (Main campus: All ages): [facility], 6th floor. After hours line: [phone]   [place] (5 years and older; younger considered on case-by-case basis): [address]; [address], [address]. Scheduling  After hours line: [phone]   [place] (6 years and older): [address]; [address]; [address]   [place] (6 years and older): [address] [facility], [address]  After hours line: [phone]   [place] (13 years and older) in [place]: [address], 2nd floor  After hours line: [phone]   [place] (13 year and older): [address]  After hours line: [phone]     Adult Only Locations:   [place] (18 years and older): [address] [place] [address], 2nd floor   [place] (18 years and older): [address] [facility]; 4th floor  After hours line: [phone]   [place] (18 years and older) at [place]: [address] [facility]  After hours line: [phone]     CONTACTING YOUR SLEEP MEDICINE PROVIDER AND SLEEP TEAM      For issues with your machine or mask interface, please call your DME provider first. DME stands for durable medical company. DME is the company who provides you the machine and/or PAP supplies / accessories.   To schedule, cancel, or reschedule SLEEP STUDY APPOINTMENTS, please call the Main Phone Line at [phone] - option 3.   To schedule, cancel, or reschedule CLINIC APPOINTMENTS, you can do it in \"Climeworkshart\", call [phone] to speak with my  (Gaby Patel), or call the Main Phone Line at [phone] - option 2  For CLINICAL QUESTIONS or MEDICATION REFILLS, please call direct line for Adult Sleep Nurses at [phone].   Lastly, you can also send a message directly to your provider through \"My Chart\", which is the email service through your  Records Account: [email or web address]       [facility], we wish you a restful sleep!

## 2024-08-02 ENCOUNTER — TELEPHONE (OUTPATIENT)
Dept: PRIMARY CARE | Facility: CLINIC | Age: 59
End: 2024-08-02
Payer: COMMERCIAL

## 2024-08-02 DIAGNOSIS — L30.9 DERMATITIS: Primary | ICD-10-CM

## 2024-08-02 RX ORDER — TRIAMCINOLONE ACETONIDE 1 MG/G
CREAM TOPICAL
Qty: 45 G | Refills: 0 | Status: SHIPPED | OUTPATIENT
Start: 2024-08-02

## 2024-08-02 NOTE — TELEPHONE ENCOUNTER
Was going to an event last night - had bug (mosquito) bite behind knee - very itchy - no marked swelling or redness or warmth - just a fairly large itchy patch - no fever chills or other constitutional symptoms of concern - has not tried a topical steroid     Requested Prescriptions     Pending Prescriptions Disp Refills    triamcinolone (Kenalog) 0.1 % cream 45 g 0     Sig: Apply to affected area 1-2 times daily as needed.      Expect will follow-up in 24-72 hours or sooner if worsening/concerns

## 2024-08-03 NOTE — TELEPHONE ENCOUNTER
Note in chart:        Was going to an event last night - had bug (mosquito) bite behind knee - very itchy - no marked swelling or redness or warmth - just a fairly large itchy patch - no fever chills or other constitutional symptoms of concern - has not tried a topical steroid          Sent in triamcinolone as ordered

## 2024-08-14 ENCOUNTER — APPOINTMENT (OUTPATIENT)
Dept: SLEEP MEDICINE | Facility: HOSPITAL | Age: 59
End: 2024-08-14
Payer: COMMERCIAL

## 2024-09-03 ENCOUNTER — LAB (OUTPATIENT)
Dept: LAB | Facility: LAB | Age: 59
End: 2024-09-03
Payer: COMMERCIAL

## 2024-09-03 DIAGNOSIS — Z00.00 HEALTHCARE MAINTENANCE: ICD-10-CM

## 2024-09-03 LAB
25(OH)D3 SERPL-MCNC: 32 NG/ML (ref 30–100)
ALBUMIN SERPL BCP-MCNC: 4.3 G/DL (ref 3.4–5)
ALP SERPL-CCNC: 67 U/L (ref 33–110)
ALT SERPL W P-5'-P-CCNC: 18 U/L (ref 7–45)
ANION GAP SERPL CALC-SCNC: 14 MMOL/L (ref 10–20)
APPEARANCE UR: CLEAR
AST SERPL W P-5'-P-CCNC: 16 U/L (ref 9–39)
BASOPHILS # BLD AUTO: 0.04 X10*3/UL (ref 0–0.1)
BASOPHILS NFR BLD AUTO: 0.6 %
BILIRUB SERPL-MCNC: 0.3 MG/DL (ref 0–1.2)
BILIRUB UR STRIP.AUTO-MCNC: NEGATIVE MG/DL
BUN SERPL-MCNC: 18 MG/DL (ref 6–23)
CALCIUM SERPL-MCNC: 9.7 MG/DL (ref 8.6–10.6)
CHLORIDE SERPL-SCNC: 105 MMOL/L (ref 98–107)
CHOLEST SERPL-MCNC: 209 MG/DL (ref 0–199)
CHOLESTEROL/HDL RATIO: 3.4
CO2 SERPL-SCNC: 29 MMOL/L (ref 21–32)
COLOR UR: YELLOW
CREAT SERPL-MCNC: 0.89 MG/DL (ref 0.5–1.05)
CRP SERPL HS-MCNC: 2.1 MG/L
EGFRCR SERPLBLD CKD-EPI 2021: 76 ML/MIN/1.73M*2
EOSINOPHIL # BLD AUTO: 0.12 X10*3/UL (ref 0–0.7)
EOSINOPHIL NFR BLD AUTO: 1.7 %
ERYTHROCYTE [DISTWIDTH] IN BLOOD BY AUTOMATED COUNT: 13.1 % (ref 11.5–14.5)
EST. AVERAGE GLUCOSE BLD GHB EST-MCNC: 105 MG/DL
GLUCOSE SERPL-MCNC: 152 MG/DL (ref 74–99)
GLUCOSE UR STRIP.AUTO-MCNC: NORMAL MG/DL
HBA1C MFR BLD: 5.3 %
HCT VFR BLD AUTO: 39.1 % (ref 36–46)
HDLC SERPL-MCNC: 61.3 MG/DL
HGB BLD-MCNC: 13 G/DL (ref 12–16)
HYALINE CASTS #/AREA URNS AUTO: ABNORMAL /LPF
IMM GRANULOCYTES # BLD AUTO: 0.09 X10*3/UL (ref 0–0.7)
IMM GRANULOCYTES NFR BLD AUTO: 1.2 % (ref 0–0.9)
KETONES UR STRIP.AUTO-MCNC: ABNORMAL MG/DL
LDLC SERPL CALC-MCNC: 114 MG/DL
LEUKOCYTE ESTERASE UR QL STRIP.AUTO: NEGATIVE
LYMPHOCYTES # BLD AUTO: 2.37 X10*3/UL (ref 1.2–4.8)
LYMPHOCYTES NFR BLD AUTO: 32.9 %
MCH RBC QN AUTO: 32.9 PG (ref 26–34)
MCHC RBC AUTO-ENTMCNC: 33.2 G/DL (ref 32–36)
MCV RBC AUTO: 99 FL (ref 80–100)
MONOCYTES # BLD AUTO: 0.49 X10*3/UL (ref 0.1–1)
MONOCYTES NFR BLD AUTO: 6.8 %
MUCOUS THREADS #/AREA URNS AUTO: ABNORMAL /LPF
NEUTROPHILS # BLD AUTO: 4.1 X10*3/UL (ref 1.2–7.7)
NEUTROPHILS NFR BLD AUTO: 56.8 %
NITRITE UR QL STRIP.AUTO: NEGATIVE
NON HDL CHOLESTEROL: 148 MG/DL (ref 0–149)
NRBC BLD-RTO: 0 /100 WBCS (ref 0–0)
PH UR STRIP.AUTO: 5.5 [PH]
PLATELET # BLD AUTO: 239 X10*3/UL (ref 150–450)
POTASSIUM SERPL-SCNC: 4.5 MMOL/L (ref 3.5–5.3)
PROT SERPL-MCNC: 6.6 G/DL (ref 6.4–8.2)
PROT UR STRIP.AUTO-MCNC: ABNORMAL MG/DL
RBC # BLD AUTO: 3.95 X10*6/UL (ref 4–5.2)
RBC # UR STRIP.AUTO: NEGATIVE /UL
RBC #/AREA URNS AUTO: ABNORMAL /HPF
SODIUM SERPL-SCNC: 143 MMOL/L (ref 136–145)
SP GR UR STRIP.AUTO: 1.02
SQUAMOUS #/AREA URNS AUTO: ABNORMAL /HPF
TRIGL SERPL-MCNC: 168 MG/DL (ref 0–149)
TSH SERPL-ACNC: 1.57 MIU/L (ref 0.44–3.98)
UROBILINOGEN UR STRIP.AUTO-MCNC: NORMAL MG/DL
VLDL: 34 MG/DL (ref 0–40)
WBC # BLD AUTO: 7.2 X10*3/UL (ref 4.4–11.3)
WBC #/AREA URNS AUTO: ABNORMAL /HPF

## 2024-09-03 PROCEDURE — 80061 LIPID PANEL: CPT

## 2024-09-03 PROCEDURE — 80053 COMPREHEN METABOLIC PANEL: CPT

## 2024-09-03 PROCEDURE — 84443 ASSAY THYROID STIM HORMONE: CPT

## 2024-09-03 PROCEDURE — 83036 HEMOGLOBIN GLYCOSYLATED A1C: CPT

## 2024-09-03 PROCEDURE — 85025 COMPLETE CBC W/AUTO DIFF WBC: CPT

## 2024-09-03 PROCEDURE — 81001 URINALYSIS AUTO W/SCOPE: CPT

## 2024-09-03 PROCEDURE — 86141 C-REACTIVE PROTEIN HS: CPT

## 2024-09-03 PROCEDURE — 36415 COLL VENOUS BLD VENIPUNCTURE: CPT

## 2024-09-03 PROCEDURE — 82306 VITAMIN D 25 HYDROXY: CPT

## 2024-09-04 ENCOUNTER — TELEPHONE (OUTPATIENT)
Dept: SLEEP MEDICINE | Facility: HOSPITAL | Age: 59
End: 2024-09-04
Payer: COMMERCIAL

## 2024-09-04 PROBLEM — G40.909 EPILEPSY (MULTI): Status: RESOLVED | Noted: 2023-05-07 | Resolved: 2024-09-04

## 2024-09-04 PROBLEM — R42 DIZZINESS AND GIDDINESS: Status: RESOLVED | Noted: 2023-09-01 | Resolved: 2024-09-04

## 2024-09-04 PROBLEM — G47.19 EXCESSIVE DAYTIME SLEEPINESS: Status: RESOLVED | Noted: 2023-04-25 | Resolved: 2024-09-04

## 2024-09-04 PROBLEM — Z78.0 ASYMPTOMATIC MENOPAUSAL STATE: Status: RESOLVED | Noted: 2023-04-25 | Resolved: 2024-09-04

## 2024-09-04 PROBLEM — Z12.4 ENCOUNTER FOR PAPANICOLAOU SMEAR FOR CERVICAL CANCER SCREENING: Status: RESOLVED | Noted: 2023-04-25 | Resolved: 2024-09-04

## 2024-09-04 PROBLEM — H93.13 TINNITUS OF BOTH EARS: Status: RESOLVED | Noted: 2023-09-01 | Resolved: 2024-09-04

## 2024-09-04 PROBLEM — R42 VERTIGO: Status: RESOLVED | Noted: 2023-05-07 | Resolved: 2024-09-04

## 2024-09-04 PROBLEM — M77.11 LATERAL EPICONDYLITIS OF RIGHT ELBOW: Status: RESOLVED | Noted: 2024-09-04 | Resolved: 2024-09-04

## 2024-09-04 LAB — HOLD SPECIMEN: NORMAL

## 2024-09-04 NOTE — PROGRESS NOTES
Physical Exam    Name Doretha Rosales    Date of Service :9/5/2024    Neuro follow up with Dr. Marcano due   Ct lung cancer screening   for nodules and discuss early copd   Scheduled tomorrow.    Mammogram due  and follow up with high risk breast clinic - scheduled September     Self pay mri pancreas- 5 years in 27   Is she using her pap therapy.   Just got it in the mail 3 weeks ago.  She is getting on boarded. She is was starting to feel more awake until yesterday.     Labs okay       Doretha Rosales is a 58 y.o. year old female who is being seen for a comprehensive exam. She has a history of Epilepsy since age 16, HELIO ( newly diagnosed Feb 2024), nicotine dependence with early upper lobe predominate copd and multiple pulmonary nodules.  She established with sleep medicine clinic 7/26/24  And PAP was prescribed.     History of episodic vertiginous dizziness and frequent falls  consulted Analilia Ortiz in ENT and referred her to vestibular therapy last fall.    MRA neck and brain neg      She underwent genetic testing in May of 2019 for her family history of multiple cancers and was found not to be a carrier of any concerning genes however based on family history, she does have a higher than average chance developing pancreatic cancer and breast cancer       Her labs this year were not fasting ( she forgot to fast) .        Her main health concerns :   Quit smoking in Jan. 2024 .  She has bilateral bunions no pain.    Occ. Getting white lesions on her face   is now head of [employer]  They now live in [facility] a year .  Walks and rides bikes. [place]  [place] 3 to 4 stories high. She works on the 3rd floor.  She has to go up and down stairs a lot.  Her knees hurt.    Bike twice a week and walks the dog daily   CBC  repeat 3 months       Patient Active Problem List   Diagnosis    Anxiety and depression    Temporal lobe epilepsy (Multi)    Tobacco dependence    Vitamin D deficiency    Annual  physical exam    High cholesterol    Seasonal allergies    Asymmetric SNHL (sensorineural hearing loss)    HELIO (obstructive sleep apnea)        Past Medical History:   Diagnosis Date    Bilateral presbyopia 2023    Cataract, nuclear sclerotic, both eyes 2023    Colitis presumed to be due to infection 2020    Depression 2023    Hayfever 2013    Heterozygous MTHFR mutation C677T 2014    Lateral epicondylitis     Lateral epicondylitis of right elbow 2024    Retinal hemorrhage, left eye 2023    Tinnitus of both ears 2023    Vertigo         Past Surgical History:   Procedure Laterality Date     SECTION, CLASSIC      CHOLECYSTECTOMY      ENDOMETRIAL BIOPSY  10/19/2016    MOUTH SURGERY  2015    Oral Surgery Tooth Extraction    MR HEAD ANGIO WO IV CONTRAST  2023    MR HEAD ANGIO WO IV CONTRAST 2023 [facility] [id number] MRI    MR NECK ANGIO WO IV CONTRAST  2023    MR NECK ANGIO WO IV CONTRAST 2023 [facility] [id number] MRI    TUBAL LIGATION          Social History     Tobacco Use    Smoking status: Former     Types: Cigarettes    Smokeless tobacco: Never      Social History     Social History Narrative    . to Jonathan. Two children. Jonathan is head of [employer]. She is a consultant for non profits. Has two children. Her Son is a student at [employer]. Daughter graduated from [employer]. 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 Doretha is a current smoker.  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 now independent consulting. walking daily. lifting weights        Family History   Problem Relation Name Age of Onset    Pancreatic cancer Mother          d. age 49    Cerebral aneurysm Father      Stroke Father          d. age 84    Other (calcium oxalate renal stones) Sister      Hemochromatosis Sister      Other (calcium oxalate renal stones [Other]) Brother      Breast cancer  Mother's Sister  64        d. age 65    Prostate cancer Mother's Brother      Prostate cancer Father's Brother  75        d.age 89    Breast cancer Maternal Grandmother          d. age 80    Cancer Paternal Grandfather  60        d. 65    Hemochromatosis Sibling      Breast cancer Other grandmother           Current Outpatient Medications:     cholecalciferol (Vitamin D-3) 25 MCG (1000 UT) tablet, Take by mouth., Disp: , Rfl:     divalproex (Depakote ER) 250 mg 24 hr tablet, Take 1 tablet (250 mg) by mouth 2 times a day. 1 pill in the morning and 1 pill at night, Disp: 180 tablet, Rfl: 3    escitalopram (Lexapro) 10 mg tablet, Take 1 tablet (10 mg) by mouth once daily., Disp: 90 tablet, Rfl: 3    lamoTRIgine (LaMICtal) 100 mg tablet, Take 1 tablet (100 mg) by mouth 2 times a day.  (in addition to one 25 mg tablet twice a day) for total dose 125 mg twice a day., Disp: 180 tablet, Rfl: 3    melatonin 1 mg tablet,chewable, Chew., Disp: , Rfl:     divalproex (Depakote ER) 250 mg 24 hr tablet, Take 1 tablet (250 mg) by mouth 2 times a day for 3 days. Do not crush, chew, or split., Disp: 6 tablet, Rfl: 0    lamoTRIgine (LaMICtal) 100 mg tablet, Take 1 tablet (100 mg) by mouth 2 times a day for 3 days., Disp: 6 tablet, Rfl: 0    lamoTRIgine (LaMICtal) 25 mg tablet, Take 1 tablet (25 mg) by mouth 2 times a day.  (in addition to one 100 mg tablet twice a day) for total dose 125 mg twice a day., Disp: 180 tablet, Rfl: 3    lamoTRIgine (LaMICtal) 25 mg tablet, Take 1 tablet (25 mg) by mouth 2 times a day for 3 days., Disp: 6 tablet, Rfl: 0    triamcinolone (Kenalog) 0.1 % cream, Apply to affected area 1-2 times daily as needed., Disp: 45 g, Rfl: 0    zoster vaccine-recombinant adjuvanted (Shingrix) 50 mcg/0.5 mL vaccine, Inject 0.5 mL into the muscle 1 time for 1 dose., Disp: 0.5 mL, Rfl: 0    Allergies   Allergen Reactions    Meperidine Other and Unknown     Vomiting       ROUTINE:     Immunizations due to update second  "shingles, covid and flu   Mammogram: last completed 4/16/23  neg  dense breast tissue and family history   PAP/GYN EXAM: 4/5/23   neg with neg co test   COLONOSCOPY: 2019 normal repeat in 10 years   DEXA: 6/15/23  normal .  Repeat age 66  self pay Pancreas MRI normal 3/2/22   OPHTHALMOLOGY: 12/7/23      Review of Systems     /75 (BP Location: Left arm, Patient Position: Sitting)   Pulse 72   Temp 36.7 °C (98 °F)   Ht 1.575 m (5' 2\")   Wt 74.8 kg (165 lb)   SpO2 97%   BMI 30.18 kg/m²  Body mass index is 30.18 kg/m².    Physical Exam  Constitutional:       General: She is not in acute distress.     Appearance: Normal appearance. She is not ill-appearing.   HENT:      Right Ear: Tympanic membrane and ear canal normal.      Left Ear: Tympanic membrane and ear canal normal.   Eyes:      Extraocular Movements: Extraocular movements intact.      Conjunctiva/sclera: Conjunctivae normal.   Cardiovascular:      Rate and Rhythm: Normal rate.      Pulses: Normal pulses.      Heart sounds: Normal heart sounds. No murmur heard.  Pulmonary:      Effort: Pulmonary effort is normal. No respiratory distress.      Breath sounds: Normal breath sounds. No wheezing, rhonchi or rales.   Chest:   Breasts:     Right: Normal. No mass, nipple discharge or skin change.      Left: Normal. No mass, nipple discharge or skin change.   Abdominal:      General: Bowel sounds are normal. There is no distension.      Palpations: There is no mass.      Tenderness: There is no abdominal tenderness. There is no guarding.   Musculoskeletal:         General: Normal range of motion.   Lymphadenopathy:      Cervical: No cervical adenopathy.      Upper Body:      Right upper body: No supraclavicular or axillary adenopathy.      Left upper body: No supraclavicular or axillary adenopathy.      Lower Body: No right inguinal adenopathy. No left inguinal adenopathy.   Skin:     General: Skin is warm and dry.      Findings: No rash.      Comments: No " suspicious rashes or lesions   Neurological:      General: No focal deficit present.      Mental Status: She is alert.   Psychiatric:         Mood and Affect: Mood normal.         RESULTS/DATA:  Reviewed Standard Labs for this physical with patient ( any significant issues addressed in A/P )     ECG: normal sinus rhythm, no blocks or conduction defects, no ischemic changes.       Assessment/Plan     1. Annual physical exam   establish with a dermatologist for full body check  Dr. De[facility] in [facility]   Update your Covid,flu and second shingles injection   - zoster vaccine-recombinant adjuvanted (Shingrix) 50 mcg/0.5 mL vaccine; Inject 0.5 mL into the muscle 1 time for 1 dose.  Dispense: 0.5 mL; Refill: 0  - ECG 12 lead (Clinic Performed)  Encouraged her to  re-establish with the high risk breast clinic for your breast cancer screening management    2. Abnormal CBC  very nonspecific changes on  CBC which usually are not clinically significant however it would be prudent to recheck  CBC in 3 months to see any trends.  An order was placed for you  - CBC and Auto Differential; Future    3. HELIO (obstructive sleep apnea)  Encouraged compliance with pap therapy     4. Temporal lobe epilepsy (Multi)  Encouraged her to follow up annually with her neurologist     5. High cholesterol  Encouraged continue lifesytle choices.  Lauded her quitting smoking      6. Family history of Cancer including Pancreas - We discussed the galleri  testing as another screening test for various cancers and information has been provided for you for you to look into and see if you are interested   I would recommend repeating a self-pay pancreas MRI 5 years from your last    Encouraged her to  re-establish with the high risk breast clinic for your breast cancer screening management    7. Knee pain with stairs   Patient information on various causes of knee pain have been provided.  I suspect that patellofemoral syndrome and  strengthening  hamstrings would help decrease pain with stairs    8.  Bunions feet- asymptomatic - advised continued conservative management     Follow up annually    Bryanna Durham MD

## 2024-09-05 ENCOUNTER — APPOINTMENT (OUTPATIENT)
Dept: PRIMARY CARE | Facility: CLINIC | Age: 59
End: 2024-09-05
Payer: COMMERCIAL

## 2024-09-05 VITALS
HEART RATE: 72 BPM | HEIGHT: 62 IN | WEIGHT: 165 LBS | SYSTOLIC BLOOD PRESSURE: 125 MMHG | TEMPERATURE: 98 F | DIASTOLIC BLOOD PRESSURE: 75 MMHG | BODY MASS INDEX: 30.36 KG/M2 | OXYGEN SATURATION: 97 %

## 2024-09-05 DIAGNOSIS — G47.33 OSA (OBSTRUCTIVE SLEEP APNEA): ICD-10-CM

## 2024-09-05 DIAGNOSIS — R79.89 ABNORMAL CBC: ICD-10-CM

## 2024-09-05 DIAGNOSIS — G40.109 TEMPORAL LOBE EPILEPSY (MULTI): ICD-10-CM

## 2024-09-05 DIAGNOSIS — Z00.00 ANNUAL PHYSICAL EXAM: Primary | ICD-10-CM

## 2024-09-05 DIAGNOSIS — E78.00 HIGH CHOLESTEROL: ICD-10-CM

## 2024-09-05 NOTE — PATIENT INSTRUCTIONS
Doretha, it was good seeing you today I am very happy for you that you were able to quit smoking  A few things we discussed to stay current on your health care  You should establish with a dermatologist for full body check  Dr. Debbie Evans in [facility]   2    Please re-establish with the high risk breast clinic for your breast cancer screening management  3. Please arrange a follow up with your Neurologist Dr. Marcano which you should see annually.   4.  Update your Covid,flu and second shingles injection   5.  We discussed the galleri  testing as another screening test for various cancers and information has been provided for you for you to look into and see if you are interested  6.  I would recommend repeating a self-pay pancreas MRI 5 years from your last  which would be in 2027  7. Increase strengthening exercises to help build muscle mass and burn fat    Patient information on various causes of knee pain have been provided.  I suspect that you have patellofemoral syndrome and strengthening your hamstrings would help decrease pain with stairs    You had very nonspecific changes on your CBC which usually are not clinically significant however it would be prudent to recheck your CBC ( blood counts) in 3 months to see any trends.  An order was placed for you    Follow up annually with me

## 2024-09-16 ENCOUNTER — TELEPHONE (OUTPATIENT)
Dept: PRIMARY CARE | Facility: CLINIC | Age: 59
End: 2024-09-16
Payer: COMMERCIAL

## 2024-09-16 NOTE — TELEPHONE ENCOUNTER
See email below  (scanned in the media tab)    Hi,  I just went to look at my test results from my lung scan last week. Can you let me know about them?  Thanks,  Doretha

## 2024-09-18 ENCOUNTER — HOSPITAL ENCOUNTER (OUTPATIENT)
Dept: RADIOLOGY | Facility: CLINIC | Age: 59
Discharge: HOME | End: 2024-09-18
Payer: COMMERCIAL

## 2024-09-18 VITALS — WEIGHT: 164.9 LBS | BODY MASS INDEX: 30.35 KG/M2 | HEIGHT: 62 IN

## 2024-09-18 DIAGNOSIS — Z12.31 SCREENING MAMMOGRAM FOR BREAST CANCER: ICD-10-CM

## 2024-09-18 PROCEDURE — 77067 SCR MAMMO BI INCL CAD: CPT | Performed by: RADIOLOGY

## 2024-09-18 PROCEDURE — 77063 BREAST TOMOSYNTHESIS BI: CPT | Performed by: RADIOLOGY

## 2024-09-18 PROCEDURE — 77067 SCR MAMMO BI INCL CAD: CPT

## 2024-10-02 NOTE — PROGRESS NOTES
[facility] Sleep Medicine  [facility]  [address]  [facility] [address]  [phone]     [facility]  Follow Up Visit Note    Virtual or Telephone Consent    An interactive audio and video telecommunication system which permits real time communications between the patient (at the originating site) and provider (at the distant site) was utilized to provide this telehealth service.   Verbal consent was requested and obtained from Doretha Rosales on this date, 10/09/24 for a telehealth visit.            Subjective  Patient ID: Doretha Rosales is a 59 y.o. female with past medical history significant for Vit D deficiency, Epilepsy, Insomnia, Nicotine dependence, and OBSTRUCTIVE SLEEP APNEA.    10/9/24: UPDATED: The patient had a virtual visit with me to review her initial pap compliance. Her over-four-hour pap compliance rate was  70 %, and Her ESS is  6 today. She reports getting more refreshed sleep after using CPAP. She had a couple of days and did not use CPAP because her dog damaged the mask and needed a replacement. Otherwise, she reports having no issues with pap and communicating with her DME.       7/26/2024: The patient had a virtual visit with me  and was referred by PCP Bryanna Durham MD  for comprehensive sleep medicine evaluation due to sleep apnea recently diagnosed. Today, we review her sleep study to treat her sleep apnea. The desensitization strategy, 30-day free mask return policy, and insurance requirements are all discussed with the patient. The patient verbalized understanding it. Her ESS: 13, and her  QUINN: 13 today.     HPI  Patient had been having these symptoms for the past 10 years.       SLEEP STUDY HISTORY: (personally reviewed raw data such as interpretation report, data sheet, hypnogram, and titration table if available and applicable)  2/24/24: Home Sleep Study: BMI : 29.29, AHI3%: 53.1/hr,  Supine AHI3%: 113.7/hr, AHI 4%: 37.9/hr, Supine AHI 4%: 95.6/hr, Adrián: 79.1%, <88%: 0.8 minutes, consider 6-16 CWP     SLEEP-WAKE SCHEDULE  Bedtime: 11:30 PM on weekdays, same on weekends  Subjective sleep latency: 20-30 minutes  Problems falling asleep: sometimes  Number of awakenings: 2 times per night spontaneously for unknown reasons  Falls back asleep in 30 minutes  Problems staying asleep: Yes  Final wake time: 9 AM on weekdays, 10:30 AM on weekends  Out of bed time: 9:15 AM on weekdays, 10:45 AM on weekends  Shift work: No  Naps: Yes, 90 minutes  Feels rested after a nap: No   Average sleep duration (excluding naps): 8-9 hours     SLEEP ENVIRONMENT  Sleep location: beds  Sleep status: sleeps alone  Room is dark:  Yes  Room is quiet: Yes  Room is cool: Yes  Bed comfort: good     SLEEP HABITS:   Activities in bed: no  Preferred sleep position: right side     SLEEP ROS: (after cpap)  Night symptoms: Denies for snoring and mouth breathing  Morning symptoms: Denies for unrefreshing sleep and morning dry mouth  Daytime symptoms Denies for daytime sleepiness, fatigue, and still having issues with memory  Hypersomnia / narcolepsy symptoms: Patient denies symptoms of a hypersomnolence disorder such as sleep paralysis, sleep-related hallucinations, recurrent sleep attacks, automatic behaviors, and cataplexy.   RLS symptoms: Patient denies RLS symptoms.  Movements in sleep: Patient denies problematic movements in sleep such as seizures during sleep, frequent leg kicks / jerks while asleep, and sleep-related bruxism.  Parasomnia symptoms: Patient denies symptoms of parasomnia such as sleepwalking, sleeptalking, sleep-eating, acting out dreams, and nightmares.      WEIGHT: stable     REVIEW OF SYSTEMS: All other systems have been reviewed and are negative.     PERTINENT SOCIAL HISTORY:  Occupation: consultant  Smoking: No   ETOH: Yes   Marijuana: No   Caffeine: Yes  Sleep aids: Yes  and Melatonin  Claustrophobia: No       Active Problems, Allergy List, Medication List, and PMH/PSH/FH/Social Hx have been reviewed and reconciled in chart. No significant changes unless documented in the pertinent chart section. Updates made when necessary.          Objective    Physical Exam  Constitutional:alert and oriented to time, place, and person        Assessment/Plan  Doretha Rosales is a 59 y.o. female who is seen to evaluate for severe obstructive sleep apnea. The pathophysiology of sleep apnea, diagnostic testing (HST vs PSG), treatment options (PAP, oral appliance, surgery, hypoglossal nerve stimulator called Inspire), and supportive management (weight loss, positional therapy, smoking cessation, avoidance of alcohol and sedatives) were discussed with the patient in detail. Risk factors of sleep apnea as well as cardiometabolic and neurocognitive sequelae associated with untreated sleep apnea were also discussed. Lastly, patient was advised to avoid driving vehicle or operating heavy machinery when sleepy.      Doretha  with the following problems:     # OBSTRUCTIVE SLEEP APNEA :  -Good compliance, continue  5-15 cmH20 auto CPAP with P10 small  and  renew annual supplies through [employer].   -Sleep apnea and PAP therapy education were provided at length in the clinic today. Doretha Rosales verbalized understanding.  -Emphasized diet, exercise, and weight loss in the clinic, as were non-supine sleep, avoiding alcohol in the late evening, and driving or operating heavy machinery when sleepy.  Doretha Rosalesverbalizes understanding of the above instructions and risks.     # CHRONIC SLEEP ONSET/ SLEEP MAINTENANCE INSOMNIA:  -sleep better  -Sleep hygiene discuss in the clinic.     # DEPRESSION and ANXIETY:   -Doretha Gann taking escitalopram (Lexapro) 10 mg and participating in psychotherapy.  -Denies HI/SI     # XEROSTOMIA:  -Report better  -Instruct Doretha Rosalesto purchase the Biotene gel to ease the dry mouth symptom,        RTC  3 months         All of patient's questions were answered. She verbalizes understanding and agreement with my assessment and plan.

## 2024-10-09 ENCOUNTER — OFFICE VISIT (OUTPATIENT)
Dept: SLEEP MEDICINE | Facility: HOSPITAL | Age: 59
End: 2024-10-09
Payer: COMMERCIAL

## 2024-10-09 DIAGNOSIS — G47.33 OBSTRUCTIVE SLEEP APNEA (ADULT) (PEDIATRIC): ICD-10-CM

## 2024-10-09 DIAGNOSIS — F32.A ANXIETY AND DEPRESSION: ICD-10-CM

## 2024-10-09 DIAGNOSIS — G47.33 OBSTRUCTIVE SLEEP APNEA SYNDROME: Primary | ICD-10-CM

## 2024-10-09 DIAGNOSIS — F41.9 ANXIETY AND DEPRESSION: ICD-10-CM

## 2024-10-09 PROCEDURE — 99213 OFFICE O/P EST LOW 20 MIN: CPT

## 2024-10-09 ASSESSMENT — SLEEP AND FATIGUE QUESTIONNAIRES
HOW LIKELY ARE YOU TO NOD OFF OR FALL ASLEEP IN A CAR, WHILE STOPPED FOR A FEW MINUTES IN TRAFFIC: WOULD NEVER DOZE
SITING INACTIVE IN A PUBLIC PLACE LIKE A CLASS ROOM OR A MOVIE THEATER: WOULD NEVER DOZE
HOW LIKELY ARE YOU TO NOD OFF OR FALL ASLEEP WHILE WATCHING TV: MODERATE CHANCE OF DOZING
HOW LIKELY ARE YOU TO NOD OFF OR FALL ASLEEP WHILE SITTING AND TALKING TO SOMEONE: WOULD NEVER DOZE
HOW LIKELY ARE YOU TO NOD OFF OR FALL ASLEEP WHILE LYING DOWN TO REST IN THE AFTERNOON WHEN CIRCUMSTANCES PERMIT: HIGH CHANCE OF DOZING
HOW LIKELY ARE YOU TO NOD OFF OR FALL ASLEEP WHEN YOU ARE A PASSENGER IN A CAR FOR AN HOUR WITHOUT A BREAK: SLIGHT CHANCE OF DOZING
HOW LIKELY ARE YOU TO NOD OFF OR FALL ASLEEP WHILE SITTING QUIETLY AFTER LUNCH WITHOUT ALCOHOL: WOULD NEVER DOZE
ESS-CHAD TOTAL SCORE: 6
HOW LIKELY ARE YOU TO NOD OFF OR FALL ASLEEP WHILE SITTING AND READING: WOULD NEVER DOZE

## 2024-10-09 ASSESSMENT — PATIENT HEALTH QUESTIONNAIRE - PHQ9
1. LITTLE INTEREST OR PLEASURE IN DOING THINGS: NOT AT ALL
SUM OF ALL RESPONSES TO PHQ9 QUESTIONS 1 AND 2: 0
2. FEELING DOWN, DEPRESSED OR HOPELESS: NOT AT ALL

## 2024-10-09 NOTE — PATIENT INSTRUCTIONS
[facility] Sleep Medicine  [facility]  [address]  [facility] [id number] [phone]       Thank you for coming to the Sleep Medicine Clinic today! Your sleep medicine provider today was: LISANDRO Lafleur Below is a summary of your treatment plan, patient education, other important information, and our contact numbers.    Dear Ms Rodrigueze        Your Sleep Provider Today: LISANDRO Lafleur  Your Primary Care Physician: Bryanna Durham MD   Your Referring Provider: Azul Botello APRN-CNP    Diagnosis: OBSTRUCTIVE SLEEP APNEA       Thank you for visiting  Sleep Medicine Clinic !       1.You are doing good , I ordered the annual supplies for you. Feel free to contact your DME company to get new supplies.    2. Please do not drive when you are sleepy and continue practicing the sleep hygiene as discussed in clinic.    3. FOR QUESTIONS AND CONCERNS:   a) : In case of problems with machine or mask interface, please contact your DME company first. DME is the company who provides you the machine and/or PAP supplies / accessories. If [employer] is your DME, you can reach them at [phone].   b): Please call my office with issues or questions: [phone] ([place]); [phone] ([facility]); [phone] ([place])    If you have a CPAP or BiPAP machine at home, please bring the unit and all accessories including the power cord to your appointments unless I tell you otherwise. Please have knowledge of the DME company you worked with to receive your PAP device. If you have copies of any previous sleep testing completed outside of , please bring with you to clinic as well. This information will make our visits more productive.     If you are new to CPAP or BiPAP, please note the minimum usage insurance requires to continuing coverage for the equipment as noted by your DME company. Please discuss equipment issues (PAP  unit, mask fit, humidification, etc.) with your [employer] company first.       In the event that you are running more than 10 minutes late to your appointment, I will kindly ask you to reschedule. Thanks.      TREATMENT PLAN     Follow-up Appointment:   Follow-up in 3 months    PATIENT EDUCATION     OBSTRUCTIVE SLEEP APNEA (HELIO) is a sleep disorder where your upper airway muscles relax during sleep and the airway intermittently and repetitively narrows and collapses leading to partially blocked airway (hypopnea) or completely blocked airway (apnea) which, in turn, can disrupt breathing in sleep, lower oxygen levels while you sleep and cause night time wakings. Because both apnea and hypopnea may cause higher carbon dioxide or low oxygen levels, untreated HELIO can lead to heart arrhythmia, elevation of blood pressure, and make it harder for the body to consolidate memory and facilitate metabolism (leading to higher blood sugars at night). Frequent partial arousals occur during sleep resulting in sleep deprivation and daytime sleepiness. HELIO is associated with an increased risk of cardiovascular disease, stroke, hypertension, and insulin resistance. Moreover, untreated HELIO with excessive daytime sleepiness can increase the risk of motor vehicular accidents.    Below are conservative strategies for HELIO regardless of HELIO severity are:   Positional therapy - Avoid sleeping on your back.   Healthy diet and regular exercise to optimize weight is highly encouraged.   Avoid alcohol late in the evening and sedative-hypnotics as these substances can make sleep apnea worse.   Improve breathing through the nose with intranasal steroid spray, saline rinse, or antihistamines    Safety: Avoid driving vehicle and operating heavy equipment while sleepy. Drowsy driving may lead to life-threatening motor vehicle accidents. A person driving while sleepy is 5 times more likely to have an accident. If you feel sleepy, pull over and take a short  power nap (sleep for less than 30 minutes). Otherwise, ask somebody to drive you.    Treatment options for sleep apnea include weight management, positional therapy, Positive Airway Therapy (PAP) therapy, oral appliance therapy, hypoglossal nerve stimulator (Inspire) and select airway surgeries.    Starting Positive Airway Pressure (PAP): You were ordered a device to wear when you sleep called PAP (Positive Airway Pressure) to treat your sleep apnea. The order will be submitted to a durable medical equipment (DME) company who will arrange setting you up with the device. They will provide all the necessary equipment and discuss use and maintenance of the device with you as well as mask fitting and process of replacing / renewing PAP supplies or accessories. Once you get the machine, please start using it immediately. You may not be successful right away and that is okay. [place] be certain that you keep trying nightly and reach out to DME if you are struggling or having issues with machine usage.     *Please follow-up with me in 1-2 months of starting CPAP to see how well it is working for you and to do some troubleshooting if needed. Also, please bring all PAP equipment with you to follow up appointments unless told otherwise.     Important things to keep in mind as you start PAP:  Insurance will monitor your usage during the first 90 days. You should use your PAP - all night, every night, and including all naps (especially if naps are more than 30 minutes) for your health. The bare minimum is to use your PAP device while sleeping for at least 4 hours per day at least 5-6 days per week.. Otherwise, your PAP device will be reclaimed by your DME company at 90 days.  There are many masks to choose from to wear with your PAP machine. If you are not comfortable with the first mask issued to you, call your DME company and ask for another option to try. You typically have a 30-day mask guarantee from the day you received  your machine.   Discuss with your provider if you are having issues breathing with the machine or if the temperature or humidity feel uncomfortable.  Expect to have an adjustment period when you start your device. It helps to continuing wearing the machine every day for a period of time until you get more used to it. You can practice with wearing the mask alone if you need, then add in the PAP air pressure a few days later.   Reach out for help if you are struggling! The sleep medicine department can be reached at [phone]  We encourage you to download data monitoring apps to your phone. For [employer] 10/11 - [employer] linda. For [place] - DreamMapper. Both apps are available in the [employer] store for free and are a great tool to monitor your progress with your PAP device night to night.    Tips for success with PAP machine usage:  Comfortable and well-fitting mask  Appropriate pressure on the machine  Using humidification  Support from bed partner and clinical team      Maintaining your CPAP/BPAP device:    The humidification chamber (aka water tank or water chamber) needs to be filled with distilled water to prevent buildup of white deposits in the future. If you cannot find distilled water, you can use tap water but expect to have white deposits buildup seen after prolonged use with tap water. If you start seeing white deposits on the water chamber, you can clean it by filling it with equal parts of distilled white vinegar and water. Let the vinegar-water mixture sit for 2 hours, and then rinse it with running tap water. Clean with soap and water then let it dry.     You should try to keep your machine clean in order to work well. Here are some tips to clean PAP supplies / accessories:    Clean the humidification chamber (aka water tank) as well as your mask and tubing at least once a week with soap and water.   Alternatively, you can fill a sink or basin with warm water and add a little mild  detergent, like Ivory dish soap. Gently wipe your supplies with the soapy water to free all the oils and dirt that may have collected. Once that's done, rinse these items with clean water until the soap is gone and let them air dry. You can hang your tubing over the curtain jerome in your bathroom so that it dries.  The mask insert (part of the mask that has contact with your skin) needs to be cleaned with soap and water daily. Another option is to wipe them down with CPAP wipes or baby wipes.    You should replace your mask and tubing frequently in order to prevent bacteria buildup, machine damage, and mask seal issues. The older the mask and hose, the high likelihood that there is bacteria buildup in it especially if they are not cleaned regularly. Dirty filters damage machines because build-up of dust and contaminants can cause machine to over-heat, and in time, damage the motor of machine. Cushions lose their seal over time as most masks are made of plastic and silicone while headgear is made of neoprene. These materials will break down with age and frequent use. Here is the recommended replacement schedule for PAP supplies / accessories:    Twice a month- disposable filters and cushions for nasal mask or nasal pillows.  Once a month- cushion for full face mask  Every 3 months- mask with headgear and PAP tubing (standard or heated hose)  Every 6 months- reusable filter, water chamber, and chin strap     Other useful information:    Some people do not put water in the tank while other people prefers to put water in the tank to prevent mouth dryness. Try to experiment to determine which is more comfortable for you.   In general, new machines have 2 years warranty on parts while health insurance allows you to have a new machine once every 5 years.     Common issues with PAP machine:    Mask gets dislodged when turning to the side: Consider getting a CPAP pillow or switching to a mask with hose on top.     Dry mouth:   "Your machine has built-in humidifier that heats up the air to prevent dry mouth. It can be adjusted to your comfort. You can try that first and increase setting one level one night at a time to check which setting is comfortable and effective in lessening dry mouth. In some patients with heated hose, adjusting tube temperature to make air warmer can improve dry mouth. If dry mouth persists despite adjusting humidity or tube temperature setting, may apply OTC Biotene gel over the gums at bedtime.  If Biotene gel is not effective, consider trying XEROSTOM gel from [employer].  Also, eliminate or reduce dose of medications that can cause dry mouth if possible. Lastly, may try getting a separate room humidifier machine.    Airleaks: Please call DME as they may need to adjust your mask or refit you with a different kind or different size of mask. In addition, you can ask DME for tips on getting a good mask seal and mask fit.     Difficulty tolerating the mask: Contact your DME to try a different kind of mask and/or call office to get a referral to Sleep Psychologist for CPAP desensitization. CPAP desensitization technique is a set of strategies that helps patient cope with claustrophobia and anxiety related to wearing mask. Alternatively, we can do a daytime mini-sleep study called PAP-nap trial wherein you will try on different kinds of mask and the sleep technician will try different pressure settings on CPAP and BPAP machines to see which specific pressure is tolerable and comfortable for you.     Water droplets or moisture within the hose and/or mask: This is called rain-out and it is caused by condensation of too much heated humidity on the cooler walls of the hose. If you have rain-out, turn down humidity settings or get a heated hose. If you already have a heated hose, turn up the \"tube temperature\" of the heated hose. Alternatively, if you don't want to get a heated hose or warmer air, may wrap the CPAP hose with " stockings to keep it somewhat warm. Also, you need to place the machine on the floor and lower the hose so that water won't travel upward towards your mask.     You can also go to the following EDUCATION WEBSITES for further information:   American Academy of Sleep Medicine http://sleepeducation.org  National Sleep Foundation: https://sleepfoundation.org  American Sleep Apnea Association: https://www.sleepapnea.org (for patients with sleep apnea)  Narcolepsy Network: https://www.narcolepsynetwork.org (for patients with narcolepsy)  [employer]: https://www.ITmedia KKcolepsy.org (for patients with narcolepsy)  Hypersomnia Foundation: https://www.hypersomniafoundation.org (for patients with idiopathic hypersomnia)  RLS foundation: https://www.rls.org (for patients with restless leg syndrome)    IMPORTANT INFORMATION     Call 911 for medical emergencies.  Our offices are generally open from Monday-Friday, 8 am - 5 pm.   There are no supporting services by either the sleep doctors or their staff on weekends and Holidays, or after 5 PM on weekdays.   If you need to get in touch with me, you may either call my office number or you can use MeeDoc.  If a referral for a test, for CPAP, or for another specialist was made, and you have not heard about scheduling this within a week, please call scheduling at [phone] ([phone]).  If you are unable to make your appointment for clinic or an overnight study, kindly call the office or sleep testing center at least 48 hours in advance to cancel and reschedule.  If you are on CPAP, please bring your device's card and/or the device to each clinic appointment.   In case of problems with PAP machine or mask interface, please contact your [employer] (Durable Medical Equipment) company first. [employer] is the company who provides you the machine and/or PAP supplies.       PRESCRIPTIONS     We require 7 days advanced notice for prescription refills. If we do not receive the request in this  time, we cannot guarantee that your medication will be refilled in time.    IMPORTANT PHONE NUMBERS     Sleep Medicine Clinic Fax: [phone]  Appointments (for Pediatric Sleep Clinic): [phone] - option 1  Appointments (for Adult Sleep Clinic): [phone] - option 2  Appointments (For Sleep Studies): [phone] - option 3  Behavioral Sleep Medicine: [phone]  Sleep Surgery: [phone]  Nutrition Service: [phone]  Weight management clinics with endocrinology: [phone]  Bariatric Services: [phone] (includes weight loss medications and weight loss surgery)  [facility]: [phone] (offers holistic approaches to weight management)  ENT (Otolaryngology): [phone]  Headache Clinic (Neurology): [phone]  Neurology: [phone]  Psychiatry: [phone]  Pulmonary Function Testing (PFT) Center: [phone]  Pulmonary Medicine: [phone]  Rewarding Return (DME): [phone]      OUR SLEEP TESTING LOCATIONS     Our team will contact you to schedule your sleep study, however, you can contact us as follow:  Main Phone Line (scheduling only): [phone]), option 3  Adult and Pediatric Locations  [facility] (6 years and older): [facility] - 4th floor ([address] [place] [address]) After hours line: [phone]  [facility] (Main campus: All ages): [facility], 6th floor. After hours line: [phone]   [place] (5 years and older; younger considered on case-by-case basis): [address]; [address], [address]. Scheduling  After hours line: [phone]   [place] (6 years and older): [address]; [address]; [address]   [place] (6 years and older): [address] [facility], [address]  After hours line: [phone]   [place] (13 years and older) in [place]: [address], 2nd floor  After hours line: [phone]  [facility] (13  "year and older): [address]  After hours line: [phone]     Adult Only Locations:   [place] (18 years and older): [address] [facility], 2nd floor   [place] (18 years and older): [address] [facility]; 4th floor  After hours line: [phone]   [place] (18 years and older) at [place]: [address] [facility]  After hours line: [phone]     CONTACTING YOUR SLEEP MEDICINE PROVIDER AND SLEEP TEAM      For issues with your machine or mask interface, please call your DME provider first. DME stands for durable medical company. [employer] is the company who provides you the machine and/or PAP supplies / accessories.   To schedule, cancel, or reschedule SLEEP STUDY APPOINTMENTS, please call the Main Phone Line at [phone] - option 3.   To schedule, cancel, or reschedule CLINIC APPOINTMENTS, you can do it in \"MyChart\", call [phone] to speak with my  (Gaby Patel), or call the Main Phone Line at [phone] - option 2  For CLINICAL QUESTIONS or MEDICATION REFILLS, please call direct line for Adult Sleep Nurses at [phone].   Lastly, you can also send a message directly to your provider through \"My Chart\", which is the email service through your  Records Account: [email or web address]       Here at [facility], we wish you a restful sleep!   "

## 2024-10-22 ENCOUNTER — TELEPHONE (OUTPATIENT)
Dept: PRIMARY CARE | Facility: CLINIC | Age: 59
End: 2024-10-22
Payer: COMMERCIAL

## 2024-10-22 NOTE — TELEPHONE ENCOUNTER
Ok, thank you. Coming in for a cold seems silly. Will reach out if it gets worse or doesn't go away.     Sent via the [employer] S21 5G, an [employer] 5G smartphone  Get [place] for Android          Hard to say if it is viral or bacterial .  Viruses usually do last 10 to 14 days and usually peak around day 5 but start to improve.  I would advise you treat your symptoms with cough syrup and advil and give it a few more days to see which way you are going.  I would also be happy to see you in the office if you would like to come in as well.     MD Jai Bledsoe and Karla Dang Chair in Clinical Excellence  [address] [place], [place] [address]  Phone [phone]  Fax [phone]      From: Doretha Rosales <[email or web address]   Sent: Tuesday, October 22, 2024 3:05 PM  To: Bryanna Durham <[email or web address]>; Pili Mcleod <[email or web address]>  Subject: Re: A cold    Yeah, I did. My mother-in-law was here this weekend so I tested right away.  Not covid.     Sent via the [employer], an [employer] 5G smartphone  Get [place] for Android    From: Bryanna Durham <[email or web address]>  Sent: Tuesday, October 22, 2024 2:57:00 PM  To: Doretha Rosales <[email or web address]>; Pili Mcleod <[email or web address]>  Subject: RE: A cold      Yuck,  Did you test for covid? If not you should      MD Jai Bledsoe and Karla Dang Chair in Clinical Excellence  [address] [place] [id number]  Phone [phone]  Fax [phone]       From: Doretha Rosales <[email or web address]>   Sent: Tuesday, October 22, 2024 1:35 PM  To: Pili Mcleod <[email or web address]>; Bryanna Durham <[email or web address]>  Subject: A cold     Good afternoon,  I got a sudden horrible sore throat when I woke up Saturday morning. Probably the worst I have ever had. It remained severe until last night, but is still there. I didn't have a fever  for most of the time.  Yesterday it started to move to a cough and really stuffed up sinuses. This morning, I still felt horrible, but coughed up a bunch of green phlegm.     I had been on a plane Thursday night and I assumed that was where I got it. But, I also read about cpap machines being breeding grounds for bacteria and I am now using one.     It was the phlegm that made me wonder if it might be a bacterial infection and if that might call for antibiotics. What do you think? Should I wait a few days to see what happens?  Thanks,  Doretha          [address] [place], [place] [address] [phone]  www.[email or web address] [email or web address]          Visit us at [email or web address].    The enclosed information is STRICTLY CONFIDENTIAL and is intended for the  use of the addressee only. [facility] and its affiliates disclaim  any responsibility for unauthorized disclosure of this information to anyone  other than the addressee.    Federal and [place] law protect patient medical information, including  psychiatric_disorders, (H.I.V) test results, A.I.Ds-related conditions,  alcohol, and/or drug_dependence or abuse disclosed in this email. Federal  regulation (42 CFR Part 2) and [place] Revised Code section 5122.31 and  3701.243 prohibit disclosure of this information without the specific  written consent of the person to whom it pertains, or as otherwise permitted  by law.

## 2024-10-25 DIAGNOSIS — J32.9 SINUSITIS, UNSPECIFIED CHRONICITY, UNSPECIFIED LOCATION: Primary | ICD-10-CM

## 2024-10-25 RX ORDER — AMOXICILLIN AND CLAVULANATE POTASSIUM 875; 125 MG/1; MG/1
875 TABLET, FILM COATED ORAL 2 TIMES DAILY
Qty: 20 TABLET | Refills: 0 | Status: SHIPPED | OUTPATIENT
Start: 2024-10-25 | End: 2024-11-04

## 2024-10-26 DIAGNOSIS — J40 BRONCHITIS: Primary | ICD-10-CM

## 2024-10-26 RX ORDER — HYDROCODONE BITARTRATE AND HOMATROPINE METHYLBROMIDE ORAL SOLUTION 5; 1.5 MG/5ML; MG/5ML
5 LIQUID ORAL EVERY 6 HOURS PRN
Qty: 100 ML | Refills: 0 | Status: SHIPPED | OUTPATIENT
Start: 2024-10-26 | End: 2024-10-31

## 2024-10-26 RX ORDER — BENZONATATE 200 MG/1
200 CAPSULE ORAL 3 TIMES DAILY PRN
Qty: 42 CAPSULE | Refills: 0 | Status: SHIPPED | OUTPATIENT
Start: 2024-10-26 | End: 2024-11-25

## 2024-12-18 ENCOUNTER — LAB (OUTPATIENT)
Dept: LAB | Facility: LAB | Age: 59
End: 2024-12-18
Payer: COMMERCIAL

## 2024-12-18 DIAGNOSIS — R79.89 ABNORMAL CBC: ICD-10-CM

## 2024-12-18 LAB
BASOPHILS # BLD AUTO: 0.04 X10*3/UL (ref 0–0.1)
BASOPHILS NFR BLD AUTO: 0.6 %
EOSINOPHIL # BLD AUTO: 0.15 X10*3/UL (ref 0–0.7)
EOSINOPHIL NFR BLD AUTO: 2.2 %
ERYTHROCYTE [DISTWIDTH] IN BLOOD BY AUTOMATED COUNT: 12.9 % (ref 11.5–14.5)
HCT VFR BLD AUTO: 41.6 % (ref 36–46)
HGB BLD-MCNC: 13.6 G/DL (ref 12–16)
IMM GRANULOCYTES # BLD AUTO: 0.04 X10*3/UL (ref 0–0.7)
IMM GRANULOCYTES NFR BLD AUTO: 0.6 % (ref 0–0.9)
LYMPHOCYTES # BLD AUTO: 2.48 X10*3/UL (ref 1.2–4.8)
LYMPHOCYTES NFR BLD AUTO: 37 %
MCH RBC QN AUTO: 33.3 PG (ref 26–34)
MCHC RBC AUTO-ENTMCNC: 32.7 G/DL (ref 32–36)
MCV RBC AUTO: 102 FL (ref 80–100)
MONOCYTES # BLD AUTO: 0.48 X10*3/UL (ref 0.1–1)
MONOCYTES NFR BLD AUTO: 7.2 %
NEUTROPHILS # BLD AUTO: 3.51 X10*3/UL (ref 1.2–7.7)
NEUTROPHILS NFR BLD AUTO: 52.4 %
NRBC BLD-RTO: 0 /100 WBCS (ref 0–0)
PLATELET # BLD AUTO: 193 X10*3/UL (ref 150–450)
RBC # BLD AUTO: 4.08 X10*6/UL (ref 4–5.2)
WBC # BLD AUTO: 6.7 X10*3/UL (ref 4.4–11.3)

## 2024-12-18 PROCEDURE — 36415 COLL VENOUS BLD VENIPUNCTURE: CPT

## 2025-01-06 NOTE — PROGRESS NOTES
[facility]  [facility]  [address]  [facility] [address]  [phone]     [facility]  Follow Up Visit Note    Virtual or Telephone Consent    An interactive audio and video telecommunication system which permits real time communications between the patient (at the originating site) and provider (at the distant site) was utilized to provide this telehealth service.   Verbal consent was requested and obtained from Doretha Rosales on this date, 01/13/25 for a telehealth visit.              Subjective  Patient ID: Doretha Rosales is a 59 y.o. female with past medical history significant for Vit D deficiency, Epilepsy, Insomnia, Nicotine dependence, and OBSTRUCTIVE SLEEP APNEA.    1/13/25: UPDATED: The patient had a virtual visit with me for 3 months follow up  to review her pap compliance. Her over 4 hours pap compliance rate was 77  %, and Her ESS  is 1  today.      10/9/24: The patient had a virtual visit with me to review her initial pap compliance. Her over-four-hour pap compliance rate was  70 %, and Her ESS is  6 today. She reports getting more refreshed sleep after using CPAP. She had a couple of days and did not use CPAP because her dog damaged the mask and needed a replacement. Otherwise, she reports having no issues with pap and communicating with her DME.      7/26/2024: The patient had a virtual visit with me  and was referred by PCP Bryanna Durham MD  for comprehensive sleep medicine evaluation due to sleep apnea recently diagnosed. Today, we review her sleep study to treat her sleep apnea. The desensitization strategy, 30-day free mask return policy, and insurance requirements are all discussed with the patient. The patient verbalized understanding it. Her ESS: 13, and her  QUINN: 13 today.     HPI  Patient had been having these symptoms for the past 10 years.       SLEEP STUDY HISTORY:  (personally reviewed raw data such as interpretation report, data sheet, hypnogram, and titration table if available and applicable)  2/24/24: Home Sleep Study: BMI : 29.29, AHI3%: 53.1/hr, Supine AHI3%: 113.7/hr, AHI 4%: 37.9/hr, Supine AHI 4%: 95.6/hr, Adrián: 79.1%, <88%: 0.8 minutes, consider 6-16 CWP     SLEEP-WAKE SCHEDULE  Bedtime: 11:30 PM on weekdays, same on weekends  Subjective sleep latency: 20-30 minutes  Problems falling asleep: sometimes  Number of awakenings: 2 times per night spontaneously for unknown reasons  Falls back asleep in 30 minutes  Problems staying asleep: Yes  Final wake time: 9 AM on weekdays, 10:30 AM on weekends  Out of bed time: 9:15 AM on weekdays, 10:45 AM on weekends  Shift work: No  Naps: Yes, 90 minutes  Feels rested after a nap: No   Average sleep duration (excluding naps): 8-9 hours     SLEEP ENVIRONMENT  Sleep location: beds  Sleep status: sleeps alone  Room is dark:  Yes  Room is quiet: Yes  Room is cool: Yes  Bed comfort: good     SLEEP HABITS:   Activities in bed: no  Preferred sleep position: right side     SLEEP ROS: (after cpap)  Night symptoms: Denies for snoring and mouth breathing  Morning symptoms: Denies for unrefreshing sleep and morning dry mouth  Daytime symptoms Denies for daytime sleepiness, fatigue, and still having issues with memory  Hypersomnia / narcolepsy symptoms: Patient denies symptoms of a hypersomnolence disorder such as sleep paralysis, sleep-related hallucinations, recurrent sleep attacks, automatic behaviors, and cataplexy.   RLS symptoms: Patient denies RLS symptoms.  Movements in sleep: Patient denies problematic movements in sleep such as seizures during sleep, frequent leg kicks / jerks while asleep, and sleep-related bruxism.  Parasomnia symptoms: Patient denies symptoms of parasomnia such as sleepwalking, sleeptalking, sleep-eating, acting out dreams, and nightmares.      WEIGHT: stable     REVIEW OF SYSTEMS: All other systems have been  reviewed and are negative.     PERTINENT SOCIAL HISTORY:  Occupation: consultant  Smoking: No   ETOH: Yes   Marijuana: No   Caffeine: Yes  Sleep aids: Yes  and Melatonin  Claustrophobia: No      Active Problems, Allergy List, Medication List, and PMH/PSH/FH/Social Hx have been reviewed and reconciled in chart. No significant changes unless documented in the pertinent chart section. Updates made when necessary.          Objective    Physical Exam  Constitutional:alert and oriented to time, place, and person    PAP Adherence:  DURABLE MEDICAL EQUIPMENT COMPANY: [employer]  Machine: THERAPY: RESMED AIRSENSE 11 PRESSURE SETTINGS: 5 - 15 cm H2O  Mask: P10 small   Issues with therapy: ISSUES WITH THERAPY: denies  Benefits with PAP: PERCEIVED BENEFITS OF PAP: refreshing sleep reduced daytime sleepiness decreased or no snoring decreased episodes of nocturia sleeping for a longer duration of time better sleep quality decreased frequency of dry mouth improved memory, concentration and/or mood    A PAP adherence download was obtained and data was reviewed personally today in clinic. (see scanned document in EPIC)           Assessment/Plan  Doretha Rosales is a 59 y.o. female who is seen to evaluate for severe obstructive sleep apnea. Risk factors of sleep apnea as well as cardiometabolic and neurocognitive sequelae associated with untreated sleep apnea were also discussed. Lastly, patient was advised to avoid driving vehicle or operating heavy machinery when sleepy.      Doretha  with the following problems:     # OBSTRUCTIVE SLEEP APNEA :  -Good compliance, continue  5-15 cmH20 auto CPAP with P10 small  and renew annual supplies through [employer].   -Sleep apnea and PAP therapy education were provided at length in the clinic today. Doretha Rosales verbalized understanding.  -Emphasized diet, exercise, and weight loss in the clinic, as were non-supine sleep, avoiding alcohol in the late evening, and  driving or operating heavy machinery when sleepy.  -Dorethaheather Salvadorsusanaverbalizes understanding of the above instructions and risks.     # CHRONIC SLEEP ONSET/ SLEEP MAINTENANCE INSOMNIA:  -sleep better  -Sleep hygiene discuss in the clinic.     # DEPRESSION and ANXIETY:   -Doretha Gann taking escitalopram (Lexapro) 10 mg and participating in psychotherapy.  -Denies HI/SI     # XEROSTOMIA:  -Report better  -Instruct Doretha Rosalesto purchase the Biotene gel to ease the dry mouth symptom,        RTC 1 year     All of patient's questions were answered. She verbalizes understanding and agreement with my assessment and plan.

## 2025-01-13 ENCOUNTER — OFFICE VISIT (OUTPATIENT)
Dept: SLEEP MEDICINE | Facility: HOSPITAL | Age: 60
End: 2025-01-13
Payer: COMMERCIAL

## 2025-01-13 DIAGNOSIS — F32.A ANXIETY AND DEPRESSION: ICD-10-CM

## 2025-01-13 DIAGNOSIS — F17.200 TOBACCO DEPENDENCE: ICD-10-CM

## 2025-01-13 DIAGNOSIS — F41.9 ANXIETY AND DEPRESSION: ICD-10-CM

## 2025-01-13 DIAGNOSIS — G40.109 TEMPORAL LOBE EPILEPSY (MULTI): ICD-10-CM

## 2025-01-13 DIAGNOSIS — G47.33 OBSTRUCTIVE SLEEP APNEA (ADULT) (PEDIATRIC): Primary | ICD-10-CM

## 2025-01-13 DIAGNOSIS — G47.33 OBSTRUCTIVE SLEEP APNEA SYNDROME: ICD-10-CM

## 2025-01-13 PROCEDURE — 99213 OFFICE O/P EST LOW 20 MIN: CPT | Mod: 24,95

## 2025-01-13 PROCEDURE — 99213 OFFICE O/P EST LOW 20 MIN: CPT

## 2025-01-13 PROCEDURE — 1036F TOBACCO NON-USER: CPT

## 2025-01-13 ASSESSMENT — PATIENT HEALTH QUESTIONNAIRE - PHQ9
2. FEELING DOWN, DEPRESSED OR HOPELESS: NOT AT ALL
1. LITTLE INTEREST OR PLEASURE IN DOING THINGS: NOT AT ALL
SUM OF ALL RESPONSES TO PHQ9 QUESTIONS 1 AND 2: 0

## 2025-01-13 ASSESSMENT — SLEEP AND FATIGUE QUESTIONNAIRES
HOW LIKELY ARE YOU TO NOD OFF OR FALL ASLEEP WHILE SITTING AND TALKING TO SOMEONE: WOULD NEVER DOZE
HOW LIKELY ARE YOU TO NOD OFF OR FALL ASLEEP WHILE WATCHING TV: WOULD NEVER DOZE
HOW LIKELY ARE YOU TO NOD OFF OR FALL ASLEEP WHEN YOU ARE A PASSENGER IN A CAR FOR AN HOUR WITHOUT A BREAK: WOULD NEVER DOZE
HOW LIKELY ARE YOU TO NOD OFF OR FALL ASLEEP WHILE SITTING QUIETLY AFTER LUNCH WITHOUT ALCOHOL: WOULD NEVER DOZE
HOW LIKELY ARE YOU TO NOD OFF OR FALL ASLEEP IN A CAR, WHILE STOPPED FOR A FEW MINUTES IN TRAFFIC: WOULD NEVER DOZE
HOW LIKELY ARE YOU TO NOD OFF OR FALL ASLEEP WHILE SITTING AND READING: WOULD NEVER DOZE
HOW LIKELY ARE YOU TO NOD OFF OR FALL ASLEEP WHILE LYING DOWN TO REST IN THE AFTERNOON WHEN CIRCUMSTANCES PERMIT: SLIGHT CHANCE OF DOZING
SITING INACTIVE IN A PUBLIC PLACE LIKE A CLASS ROOM OR A MOVIE THEATER: WOULD NEVER DOZE
ESS-CHAD TOTAL SCORE: 1

## 2025-01-13 NOTE — PATIENT INSTRUCTIONS
[facility] Sleep Medicine  [facility]  [address]  [facility] [id number] [phone]       Thank you for coming to the Sleep Medicine Clinic today! Your sleep medicine provider today was: LISANDRO Lafleur Below is a summary of your treatment plan, patient education, other important information, and our contact numbers.    Dear Ms Rodrigueze        Your Sleep Provider Today: LISANDRO Lafleur  Your Primary Care Physician: Bryanna Durham MD   Your Referring Provider: Azul Botello APRN-CNP    Diagnosis: OBSTRUCTIVE SLEEP APNEA       Thank you for visiting  Sleep Medicine Clinic !     1.You are doing great, I ordered the annual supplies for you. Feel free to contact your DME company to get new supplies.    2. Please do not drive when you are sleepy and continue practicing the sleep hygiene as discussed in clinic.    3. FOR QUESTIONS AND CONCERNS:   a) : In case of problems with machine or mask interface, please contact your DME company first. DME is the company who provides you the machine and/or PAP supplies / accessories. If [employer] is your DME, you can reach them at [phone].   b):  Please call my office with issues or questions: [phone] ([place]); [phone] ([facility]); [phone] ([place])    If you have a CPAP or BiPAP machine at home, please bring the unit and all accessories including the power cord to your appointments unless I tell you otherwise. Please have knowledge of the DME company you worked with to receive your PAP device. If you have copies of any previous sleep testing completed outside of , please bring with you to clinic as well. This information will make our visits more productive.     If you are new to CPAP or BiPAP, please note the minimum usage insurance requires to continuing coverage for the equipment as noted by your DME company. Please discuss equipment issues (PAP  "unit, mask fit, humidification, etc.) with your DME company first.       In the event that you are running more than 10 minutes late to your appointment, I will kindly ask you to reschedule. Thanks.      TREATMENT PLAN     - Continue current machine settings. Continue using machine every night all night.   - Renew PAP supplies. Order placed and sent to [employer].  - Please read the \"Patient Education\" section below for more detailed information. Try implementing tips, reminders, strategies, and supportive management.   - If not yet done, please sign up for [employer] to make a future schedule, send prescription requests, or send messages.    Follow-up Appointment:   Follow-up in 1 year.    PATIENT EDUCATION     OBSTRUCTIVE SLEEP APNEA (HELIO) is a sleep disorder where your upper airway muscles relax during sleep and the airway intermittently and repetitively narrows and collapses leading to partially blocked airway (hypopnea) or completely blocked airway (apnea) which, in turn, can disrupt breathing in sleep, lower oxygen levels while you sleep and cause night time wakings. Because both apnea and hypopnea may cause higher carbon dioxide or low oxygen levels, untreated HELIO can lead to heart arrhythmia, elevation of blood pressure, and make it harder for the body to consolidate memory and facilitate metabolism (leading to higher blood sugars at night). Frequent partial arousals occur during sleep resulting in sleep deprivation and daytime sleepiness. HELIO is associated with an increased risk of cardiovascular disease, stroke, hypertension, and insulin resistance. Moreover, untreated HELIO with excessive daytime sleepiness can increase the risk of motor vehicular accidents.    Below are conservative strategies for HELIO regardless of HELIO severity are:   Positional therapy - Avoid sleeping on your back.   Healthy diet and regular exercise to optimize weight is highly encouraged.   Avoid alcohol late in the evening " and sedative-hypnotics as these substances can make sleep apnea worse.   Improve breathing through the nose with intranasal steroid spray, saline rinse, or antihistamines    Safety: Avoid driving vehicle and operating heavy equipment while sleepy. Drowsy driving may lead to life-threatening motor vehicle accidents. A person driving while sleepy is 5 times more likely to have an accident. If you feel sleepy, pull over and take a short power nap (sleep for less than 30 minutes). Otherwise, ask somebody to drive you.    Treatment options for sleep apnea include weight management, positional therapy, Positive Airway Therapy (PAP) therapy, oral appliance therapy, hypoglossal nerve stimulator (Inspire) and select airway surgeries.    Starting Positive Airway Pressure (PAP): You were ordered a device to wear when you sleep called PAP (Positive Airway Pressure) to treat your sleep apnea. The order will be submitted to a durable medical equipment (DME) company who will arrange setting you up with the device. They will provide all the necessary equipment and discuss use and maintenance of the device with you as well as mask fitting and process of replacing / renewing PAP supplies or accessories. Once you get the machine, please start using it immediately. You may not be successful right away and that is okay. [place] be certain that you keep trying nightly and reach out to DME if you are struggling or having issues with machine usage.     *Please follow-up with me in 1-2 months of starting CPAP to see how well it is working for you and to do some troubleshooting if needed. Also, please bring all PAP equipment with you to follow up appointments unless told otherwise.     Important things to keep in mind as you start PAP:  Insurance will monitor your usage during the first 90 days. You should use your PAP - all night, every night, and including all naps (especially if naps are more than 30 minutes) for your health. The bare  minimum is to use your PAP device while sleeping for at least 4 hours per day at least 5-6 days per week.. Otherwise, your PAP device will be reclaimed by your DME company at 90 days.  There are many masks to choose from to wear with your PAP machine. If you are not comfortable with the first mask issued to you, call your DME company and ask for another option to try. You typically have a 30-day mask guarantee from the day you received your machine.   Discuss with your provider if you are having issues breathing with the machine or if the temperature or humidity feel uncomfortable.  Expect to have an adjustment period when you start your device. It helps to continuing wearing the machine every day for a period of time until you get more used to it. You can practice with wearing the mask alone if you need, then add in the PAP air pressure a few days later.   Reach out for help if you are struggling! The sleep medicine department can be reached at [phone]  We encourage you to download data monitoring apps to your phone. For [place] 10/11 - MyAir linda. For ZOOM TV - DreamMapper. Both apps are available in the Linda store for free and are a great tool to monitor your progress with your PAP device night to night.    Tips for success with PAP machine usage:  Comfortable and well-fitting mask  Appropriate pressure on the machine  Using humidification  Support from bed partner and clinical team      Maintaining your CPAP/BPAP device:    The humidification chamber (aka water tank or water chamber) needs to be filled with distilled water to prevent buildup of white deposits in the future. If you cannot find distilled water, you can use tap water but expect to have white deposits buildup seen after prolonged use with tap water. If you start seeing white deposits on the water chamber, you can clean it by filling it with equal parts of distilled white vinegar and water. Let the vinegar-water mixture  sit for 2 hours, and then rinse it with running tap water. Clean with soap and water then let it dry.     You should try to keep your machine clean in order to work well. Here are some tips to clean PAP supplies / accessories:    Clean the humidification chamber (aka water tank) as well as your mask and tubing at least once a week with soap and water.   Alternatively, you can fill a sink or basin with warm water and add a little mild detergent, like Ivory dish soap. Gently wipe your supplies with the soapy water to free all the oils and dirt that may have collected. Once that's done, rinse these items with clean water until the soap is gone and let them air dry. You can hang your tubing over the curtain jerome in your bathroom so that it dries.  The mask insert (part of the mask that has contact with your skin) needs to be cleaned with soap and water daily. Another option is to wipe them down with CPAP wipes or baby wipes.    You should replace your mask and tubing frequently in order to prevent bacteria buildup, machine damage, and mask seal issues. The older the mask and hose, the high likelihood that there is bacteria buildup in it especially if they are not cleaned regularly. Dirty filters damage machines because build-up of dust and contaminants can cause machine to over-heat, and in time, damage the motor of machine. Cushions lose their seal over time as most masks are made of plastic and silicone while headgear is made of neoprene. These materials will break down with age and frequent use. Here is the recommended replacement schedule for PAP supplies / accessories:    Twice a month- disposable filters and cushions for nasal mask or nasal pillows.  Once a month- cushion for full face mask  Every 3 months- mask with headgear and PAP tubing (standard or heated hose)  Every 6 months- reusable filter, water chamber, and chin strap     Other useful information:    Some people do not put water in the tank while other  people prefers to put water in the tank to prevent mouth dryness. Try to experiment to determine which is more comfortable for you.   In general, new machines have 2 years warranty on parts while health insurance allows you to have a new machine once every 5 years.     Common issues with PAP machine:    Mask gets dislodged when turning to the side: Consider getting a CPAP pillow or switching to a mask with hose on top.     Dry mouth:  Your machine has built-in humidifier that heats up the air to prevent dry mouth. It can be adjusted to your comfort. You can try that first and increase setting one level one night at a time to check which setting is comfortable and effective in lessening dry mouth. In some patients with heated hose, adjusting tube temperature to make air warmer can improve dry mouth. If dry mouth persists despite adjusting humidity or tube temperature setting, may apply OTC Biotene gel over the gums at bedtime.  If Biotene gel is not effective, consider trying XEROSTOM gel from [employer].  Also, eliminate or reduce dose of medications that can cause dry mouth if possible. Lastly, may try getting a separate room humidifier machine.    Airleaks: Please call DME as they may need to adjust your mask or refit you with a different kind or different size of mask. In addition, you can ask DME for tips on getting a good mask seal and mask fit.     Difficulty tolerating the mask: Contact your DME to try a different kind of mask and/or call office to get a referral to Sleep Psychologist for CPAP desensitization. CPAP desensitization technique is a set of strategies that helps patient cope with claustrophobia and anxiety related to wearing mask. Alternatively, we can do a daytime mini-sleep study called PAP-nap trial wherein you will try on different kinds of mask and the sleep technician will try different pressure settings on CPAP and BPAP machines to see which specific pressure is tolerable and comfortable  "for you.     Water droplets or moisture within the hose and/or mask: This is called rain-out and it is caused by condensation of too much heated humidity on the cooler walls of the hose. If you have rain-out, turn down humidity settings or get a heated hose. If you already have a heated hose, turn up the \"tube temperature\" of the heated hose. Alternatively, if you don't want to get a heated hose or warmer air, may wrap the CPAP hose with stockings to keep it somewhat warm. Also, you need to place the machine on the floor and lower the hose so that water won't travel upward towards your mask.     You can also go to the following EDUCATION WEBSITES for further information:   American Academy of Sleep Medicine http://sleepeducation.org  National Sleep Foundation: https://sleepfoundation.org  American Sleep Apnea Association: https://www.sleepapnea.org (for patients with sleep apnea)  Narcolepsy Network: https://www.narcolepsynetwork.org (for patients with narcolepsy)  WakeUp[employer]arcolepsy [employer]: https://www.wakeupnarcolepsy.org (for patients with narcolepsy)  Hypersomnia Foundation: https://www.hypersomniafoundation.org (for patients with idiopathic hypersomnia)  RLS foundation: https://www.rls.org (for patients with restless leg syndrome)    IMPORTANT INFORMATION     Call 911 for medical emergencies.  Our offices are generally open from Monday-Friday, 8 am - 5 pm.   There are no supporting services by either the sleep doctors or their staff on weekends and Holidays, or after 5 PM on weekdays.   If you need to get in touch with me, you may either call my office number or you can use [employer].  If a referral for a test, for CPAP, or for another specialist was made, and you have not heard about scheduling this within a week, please call scheduling at [phone]).  If you are unable to make your appointment for clinic or an overnight study, kindly call the office or sleep testing center at least 48 hours in advance to " cancel and reschedule.  If you are on CPAP, please bring your device's card and/or the device to each clinic appointment.   In case of problems with PAP machine or mask interface, please contact your DME (Durable Medical Equipment) company first. DME is the company who provides you the machine and/or PAP supplies.       PRESCRIPTIONS     We require 7 days advanced notice for prescription refills. If we do not receive the request in this time, we cannot guarantee that your medication will be refilled in time.    IMPORTANT PHONE NUMBERS     Sleep Medicine Clinic Fax: [phone]  Appointments (for Pediatric Sleep Clinic): [phone] - option 1  Appointments (for Adult Sleep Clinic): [phone] - option 2  Appointments (For Sleep Studies): [phone] - option 3  Behavioral Sleep Medicine: [phone]  Sleep Surgery: [phone]  Nutrition Service: [phone]  Weight management clinics with endocrinology: [phone]  Bariatric Services: [phone] (includes weight loss medications and weight loss surgery)  [facility]: [phone] (offers holistic approaches to weight management)  ENT (Otolaryngology): [phone]  Headache Clinic (Neurology): [phone]  Neurology: [phone]  Psychiatry: [phone]  Pulmonary Function Testing (PFT) Center: [phone]  Pulmonary Medicine: [phone]  [employer] ([employer]): [phone]      OUR SLEEP TESTING LOCATIONS     Our team will contact you to schedule your sleep study, however, you can contact us as follow:  Main Phone Line (scheduling only): [phone]), option 3  Adult and Pediatric Locations  [facility] (6 years and older): [facility] - 4th floor ([address] [place] [address]) After hours line: [phone]  [facility] (Main campus: All ages): [facility], 6th floor. After hours line: [phone]  [facility] (5 years and older;  "younger considered on case-by-case basis): [address]; [address], [address]. Scheduling  After hours line: [phone]   [place] (6 years and older): [address]; [address]; [address]   [place] (6 years and older): [address] [facility]  After hours line: [phone]   [place] (13 years and older) in [place]: [address], 2nd floor  After hours line: [phone]   [place] (13 year and older): [address]  After hours line: [phone]     Adult Only Locations:   [place] (18 years and older): [address] [facility], 2nd floor   [place] (18 years and older): [address] [facility]; 4th floor  After hours line: [phone]   [place] (18 years and older) at [place]: [address] [place] [address]  After hours line: [phone]     CONTACTING YOUR SLEEP MEDICINE PROVIDER AND SLEEP TEAM      For issues with your machine or mask interface, please call your DME provider first. DME stands for durable medical company. DME is the company who provides you the machine and/or PAP supplies / accessories.   To schedule, cancel, or reschedule SLEEP STUDY APPOINTMENTS, please call the Main Phone Line at [phone] - option 3.   To schedule, cancel, or reschedule CLINIC APPOINTMENTS, you can do it in \"[employer]\", call [phone] to speak with my  (Gaby Patel), or call the Main Phone Line at [phone] - option 2  For CLINICAL QUESTIONS or MEDICATION REFILLS, please call direct line for Adult Sleep Nurses at [phone].   Lastly, you can also send a message directly to your provider through \"My Chart\", which is the email service through your  Records Account: [email or web address]       Here at [facility], we wish you a restful sleep!   "

## 2025-02-26 DIAGNOSIS — T78.3XXA ANGIOEDEMA, INITIAL ENCOUNTER: Primary | ICD-10-CM

## 2025-02-26 RX ORDER — PREDNISONE 20 MG/1
40 TABLET ORAL DAILY
Qty: 10 TABLET | Refills: 0 | Status: SHIPPED | OUTPATIENT
Start: 2025-02-26 | End: 2025-03-03

## 2025-05-15 ENCOUNTER — HOSPITAL ENCOUNTER (OUTPATIENT)
Dept: RADIOLOGY | Facility: CLINIC | Age: 60
Discharge: HOME | End: 2025-05-15
Payer: COMMERCIAL

## 2025-05-15 ENCOUNTER — OFFICE VISIT (OUTPATIENT)
Dept: ORTHOPEDIC SURGERY | Facility: CLINIC | Age: 60
End: 2025-05-15
Payer: COMMERCIAL

## 2025-05-15 VITALS — HEIGHT: 62 IN | WEIGHT: 164 LBS | BODY MASS INDEX: 30.18 KG/M2

## 2025-05-15 DIAGNOSIS — M79.642 HAND PAIN, LEFT: ICD-10-CM

## 2025-05-15 DIAGNOSIS — M65.242 CALCIFIC TENDINITIS OF LEFT HAND: Primary | ICD-10-CM

## 2025-05-15 PROCEDURE — 73130 X-RAY EXAM OF HAND: CPT | Mod: LT

## 2025-05-15 PROCEDURE — 99213 OFFICE O/P EST LOW 20 MIN: CPT | Performed by: PHYSICIAN ASSISTANT

## 2025-05-15 PROCEDURE — 99203 OFFICE O/P NEW LOW 30 MIN: CPT | Performed by: PHYSICIAN ASSISTANT

## 2025-05-15 PROCEDURE — 3008F BODY MASS INDEX DOCD: CPT | Performed by: PHYSICIAN ASSISTANT

## 2025-05-15 PROCEDURE — 1036F TOBACCO NON-USER: CPT | Performed by: PHYSICIAN ASSISTANT

## 2025-05-15 RX ORDER — METHYLPREDNISOLONE 4 MG/1
TABLET ORAL
Qty: 21 TABLET | Refills: 0 | Status: SHIPPED | OUTPATIENT
Start: 2025-05-15

## 2025-05-15 ASSESSMENT — PAIN SCALES - GENERAL: PAINLEVEL_OUTOF10: 5 - MODERATE PAIN

## 2025-05-15 ASSESSMENT — PAIN DESCRIPTION - DESCRIPTORS: DESCRIPTORS: ACHING;SORE

## 2025-05-15 ASSESSMENT — PAIN - FUNCTIONAL ASSESSMENT: PAIN_FUNCTIONAL_ASSESSMENT: 0-10

## 2025-05-15 NOTE — PROGRESS NOTES
59-year-old female presents to clinic today for evaluation of left index finger pain.  She woke up on Monday night with severe left index finger pain.  She does not recall any specific recent injury or trauma.  She also noticed swelling.  The swelling got worse on Wednesday however then improved a bit today.  She still has a lot of pain however associated with the finger unable to bend the digit given the pain.  Denies any history of diabetes, gout.  Denies any fevers or chills.  Has been taking over-the-counter medication without significant relief.    Patient's self reported past medical history, medications, allergies, surgical history, family and social history as well as a 10 point review of systems has been documented in the new patient intake form and scanned into the patient's electronic medical record. Pertinent findings are documented in the HPI.    Physical Examination Findings:  Constitutional: Appears well-developed and well-nourished.  Head: Normocephalic and atraumatic.  Eyes: Pupils are equal and round.  Cardiovascular: Intact distal pulses.   Respiratory: Effort normal. No respiratory distress.  Neurologic: Alert and oriented to person, place, and time.  Skin: Skin is warm and dry.  Hematologic / Lymphatic: No lymphedema, lymphangitis.  Psychiatric: normal mood and affect. Behavior is normal.   Musculoskeletal: Left index finger with mild fusiform swelling.  No open abrasions or lacerations.  No tenderness over the flexor tendon sheath.  No A1 pulley tenderness.  Patient has significant limitations in finger flexion today secondary to pain and swelling.  Most of her tenderness is located over the ulnar border of the DIP joint.  She has intact extension and flexion of the PIP and DIP joints although limited.  Fingers warm and well-perfused.  No signs of any active infection    New x-rays taken today of the left hand reveal a small area of calcification over the volar ulnar border of the DIP joint.   No significant degenerative changes no acute findings    Impression: Calcification tendinitis left index finger    Plan: We have discussed her symptoms and diagnosis in detail today.  We discussed treatment options including a course of an oral steroid versus a steroid injection.  At this time she is not interested in an injection.  We will get her started on a oral steroid taper.  She was encouraged to continue to work on finger flexion and extension.  She will continue to monitor symptoms and return to our office for any recurrence or progression of symptoms    Odalys Oakes PA-C  Department of Orthopaedic Surgery  [facility]    Dictation performed with the use of voice recognition software. Syntax and grammatical errors may exist.

## 2025-06-18 DIAGNOSIS — F32.A DEPRESSION, UNSPECIFIED DEPRESSION TYPE: ICD-10-CM

## 2025-06-18 DIAGNOSIS — G40.109 TEMPORAL LOBE EPILEPSY (MULTI): ICD-10-CM

## 2025-06-18 DIAGNOSIS — G40.309 GENERALIZED CONVULSIVE EPILEPSY (MULTI): ICD-10-CM

## 2025-06-18 RX ORDER — LAMOTRIGINE 100 MG/1
100 TABLET ORAL 2 TIMES DAILY
Qty: 180 TABLET | Refills: 3 | Status: SHIPPED | OUTPATIENT
Start: 2025-06-18 | End: 2026-06-18

## 2025-06-18 RX ORDER — DIVALPROEX SODIUM 250 MG/1
250 TABLET, FILM COATED, EXTENDED RELEASE ORAL 2 TIMES DAILY
Qty: 180 TABLET | Refills: 3 | Status: SHIPPED | OUTPATIENT
Start: 2025-06-18 | End: 2026-06-18

## 2025-06-18 RX ORDER — LAMOTRIGINE 25 MG/1
25 TABLET ORAL 2 TIMES DAILY
Qty: 180 TABLET | Refills: 3 | Status: SHIPPED | OUTPATIENT
Start: 2025-06-18 | End: 2026-06-18

## 2025-06-18 RX ORDER — ESCITALOPRAM OXALATE 10 MG/1
10 TABLET ORAL DAILY
Qty: 90 TABLET | Refills: 3 | Status: SHIPPED | OUTPATIENT
Start: 2025-06-18